# Patient Record
Sex: FEMALE | Race: WHITE | NOT HISPANIC OR LATINO | Employment: FULL TIME | URBAN - METROPOLITAN AREA
[De-identification: names, ages, dates, MRNs, and addresses within clinical notes are randomized per-mention and may not be internally consistent; named-entity substitution may affect disease eponyms.]

---

## 2018-10-02 ENCOUNTER — RECORDS - HEALTHEAST (OUTPATIENT)
Dept: LAB | Facility: CLINIC | Age: 53
End: 2018-10-02

## 2018-10-02 LAB
T3 SERPL-MCNC: 71 NG/DL (ref 45–175)
T4 FREE SERPL-MCNC: 1 NG/DL (ref 0.7–1.8)
THYROID PEROXIDASE ANTIBODIES - HISTORICAL: 161.5 IU/ML (ref 0–5.6)
TSH SERPL DL<=0.005 MIU/L-ACNC: 2.46 UIU/ML (ref 0.3–5)

## 2018-10-05 LAB — TSI ACT/NOR SER: 100 %

## 2019-12-03 ENCOUNTER — RECORDS - HEALTHEAST (OUTPATIENT)
Dept: LAB | Facility: CLINIC | Age: 54
End: 2019-12-03

## 2019-12-05 LAB — BACTERIA SPEC CULT: NORMAL

## 2020-11-24 ENCOUNTER — RECORDS - HEALTHEAST (OUTPATIENT)
Dept: LAB | Facility: CLINIC | Age: 55
End: 2020-11-24

## 2020-11-24 LAB
CHOLEST SERPL-MCNC: 181 MG/DL
FASTING STATUS PATIENT QL REPORTED: YES
FASTING STATUS PATIENT QL REPORTED: YES
HDLC SERPL-MCNC: 64 MG/DL
HOMOCYSTEINE PLASMA - HISTORICAL: 9 UMOL/L (ref 0–13)
LDLC SERPL CALC-MCNC: 106 MG/DL
T4 FREE SERPL-MCNC: 1 NG/DL (ref 0.7–1.8)
TRIGL SERPL-MCNC: 55 MG/DL
TSH SERPL DL<=0.005 MIU/L-ACNC: 2.51 UIU/ML (ref 0.3–5)
VIT B12 SERPL-MCNC: >2000 PG/ML (ref 213–816)

## 2020-11-26 LAB — METHYLMALONATE SERPL-SCNC: <0.1 UMOL/L (ref 0–0.4)

## 2021-04-08 ENCOUNTER — AMBULATORY - HEALTHEAST (OUTPATIENT)
Dept: NURSING | Facility: CLINIC | Age: 56
End: 2021-04-08

## 2021-04-29 ENCOUNTER — AMBULATORY - HEALTHEAST (OUTPATIENT)
Dept: NURSING | Facility: CLINIC | Age: 56
End: 2021-04-29

## 2021-05-28 ENCOUNTER — RECORDS - HEALTHEAST (OUTPATIENT)
Dept: LAB | Facility: CLINIC | Age: 56
End: 2021-05-28

## 2021-05-28 LAB
ALBUMIN SERPL-MCNC: 4.3 G/DL (ref 3.5–5)
ALP SERPL-CCNC: 102 U/L (ref 45–120)
ALT SERPL W P-5'-P-CCNC: <9 U/L (ref 0–45)
ANION GAP SERPL CALCULATED.3IONS-SCNC: 12 MMOL/L (ref 5–18)
AST SERPL W P-5'-P-CCNC: 16 U/L (ref 0–40)
BILIRUB SERPL-MCNC: 0.7 MG/DL (ref 0–1)
BUN SERPL-MCNC: 11 MG/DL (ref 8–22)
CALCIUM SERPL-MCNC: 9.4 MG/DL (ref 8.5–10.5)
CHLORIDE BLD-SCNC: 104 MMOL/L (ref 98–107)
CO2 SERPL-SCNC: 25 MMOL/L (ref 22–31)
CREAT SERPL-MCNC: 0.84 MG/DL (ref 0.6–1.1)
GFR SERPL CREATININE-BSD FRML MDRD: >60 ML/MIN/1.73M2
GLUCOSE BLD-MCNC: 85 MG/DL (ref 70–125)
POTASSIUM BLD-SCNC: 3.9 MMOL/L (ref 3.5–5)
PROT SERPL-MCNC: 6.9 G/DL (ref 6–8)
SODIUM SERPL-SCNC: 141 MMOL/L (ref 136–145)
TSH SERPL DL<=0.005 MIU/L-ACNC: 2.01 UIU/ML (ref 0.3–5)

## 2021-06-01 ENCOUNTER — RECORDS - HEALTHEAST (OUTPATIENT)
Dept: LAB | Facility: CLINIC | Age: 56
End: 2021-06-01

## 2021-06-01 ENCOUNTER — RECORDS - HEALTHEAST (OUTPATIENT)
Dept: ADMINISTRATIVE | Facility: CLINIC | Age: 56
End: 2021-06-01

## 2021-06-01 LAB
C DIFF TOX B STL QL: NEGATIVE
RIBOTYPE 027/NAP1/BI: NORMAL
WBC STL QL MICRO: NORMAL

## 2021-06-02 ENCOUNTER — RECORDS - HEALTHEAST (OUTPATIENT)
Dept: ADMINISTRATIVE | Facility: CLINIC | Age: 56
End: 2021-06-02

## 2021-06-02 LAB
FAT QUALITATIVE STOOL NEUTRAL - HISTORICAL: NORMAL GLOBULES/HPF
FAT QUALITATIVE STOOL TOTAL - HISTORICAL: NORMAL GLOBULES/HPF
O+P STL MICRO: NORMAL

## 2021-06-15 ENCOUNTER — RECORDS - HEALTHEAST (OUTPATIENT)
Dept: LAB | Facility: CLINIC | Age: 56
End: 2021-06-15

## 2021-06-16 ENCOUNTER — RECORDS - HEALTHEAST (OUTPATIENT)
Dept: LAB | Facility: CLINIC | Age: 56
End: 2021-06-16

## 2021-06-16 LAB
ANION GAP SERPL CALCULATED.3IONS-SCNC: 7 MMOL/L (ref 5–18)
BASOPHILS # BLD AUTO: 0 THOU/UL (ref 0–0.2)
BASOPHILS NFR BLD AUTO: 1 % (ref 0–2)
BUN SERPL-MCNC: 15 MG/DL (ref 8–22)
C REACTIVE PROTEIN LHE: 0.6 MG/DL (ref 0–0.8)
CALCIUM SERPL-MCNC: 9.5 MG/DL (ref 8.5–10.5)
CHLORIDE BLD-SCNC: 107 MMOL/L (ref 98–107)
CO2 SERPL-SCNC: 27 MMOL/L (ref 22–31)
CREAT SERPL-MCNC: 0.82 MG/DL (ref 0.6–1.1)
EOSINOPHIL # BLD AUTO: 0.3 THOU/UL (ref 0–0.4)
EOSINOPHIL NFR BLD AUTO: 6 % (ref 0–6)
ERYTHROCYTE [DISTWIDTH] IN BLOOD BY AUTOMATED COUNT: 12.4 % (ref 11–14.5)
ERYTHROCYTE [SEDIMENTATION RATE] IN BLOOD BY WESTERGREN METHOD: 20 MM/HR (ref 0–20)
GFR SERPL CREATININE-BSD FRML MDRD: >60 ML/MIN/1.73M2
GLUCOSE BLD-MCNC: 93 MG/DL (ref 70–125)
HBA1C MFR BLD: 5.2 %
HBV SURFACE AG SERPL QL IA: NEGATIVE
HCT VFR BLD AUTO: 34.6 % (ref 35–47)
HCV AB SERPL QL IA: NEGATIVE
HGB BLD-MCNC: 11.1 G/DL (ref 12–16)
IMM GRANULOCYTES # BLD: 0 THOU/UL
IMM GRANULOCYTES NFR BLD: 0 %
LYMPHOCYTES # BLD AUTO: 1.3 THOU/UL (ref 0.8–4.4)
LYMPHOCYTES NFR BLD AUTO: 34 % (ref 20–40)
MCH RBC QN AUTO: 29.9 PG (ref 27–34)
MCHC RBC AUTO-ENTMCNC: 32.1 G/DL (ref 32–36)
MCV RBC AUTO: 93 FL (ref 80–100)
MONOCYTES # BLD AUTO: 0.3 THOU/UL (ref 0–0.9)
MONOCYTES NFR BLD AUTO: 8 % (ref 2–10)
NEUTROPHILS # BLD AUTO: 2 THOU/UL (ref 2–7.7)
NEUTROPHILS NFR BLD AUTO: 50 % (ref 50–70)
PLATELET # BLD AUTO: 211 THOU/UL (ref 140–440)
PMV BLD AUTO: 9.7 FL (ref 8.5–12.5)
POTASSIUM BLD-SCNC: 3.9 MMOL/L (ref 3.5–5)
RBC # BLD AUTO: 3.71 MILL/UL (ref 3.8–5.4)
SODIUM SERPL-SCNC: 141 MMOL/L (ref 136–145)
T4 FREE SERPL-MCNC: 0.9 NG/DL (ref 0.7–1.8)
TSH SERPL DL<=0.005 MIU/L-ACNC: 3.35 UIU/ML (ref 0.3–5)
VIT B12 SERPL-MCNC: 571 PG/ML (ref 213–816)
WBC: 3.9 THOU/UL (ref 4–11)

## 2021-06-17 LAB
FASTING STATUS PATIENT QL REPORTED: YES
HOMOCYSTEINE PLASMA - HISTORICAL: 4 UMOL/L (ref 0–13)

## 2021-06-18 LAB — ANA SER QL: 1 U

## 2021-06-19 LAB — PYRIDOXAL PHOS SERPL-SCNC: 51.4 NMOL/L (ref 20–125)

## 2021-06-20 LAB — METHYLMALONATE SERPL-SCNC: 0.13 UMOL/L (ref 0–0.4)

## 2021-06-30 ENCOUNTER — RECORDS - HEALTHEAST (OUTPATIENT)
Dept: LAB | Facility: CLINIC | Age: 56
End: 2021-06-30

## 2021-06-30 LAB
FERRITIN SERPL-MCNC: 62 NG/ML (ref 10–130)
FOLATE SERPL-MCNC: 17.4 NG/ML
IRON SATN MFR SERPL: 37 % (ref 20–50)
IRON SERPL-MCNC: 110 UG/DL (ref 42–175)
TIBC SERPL-MCNC: 301 UG/DL (ref 313–563)
TRANSFERRIN SERPL-MCNC: 241 MG/DL (ref 212–360)

## 2021-07-01 LAB — 25(OH)D3 SERPL-MCNC: 42.4 NG/ML (ref 30–80)

## 2021-07-04 LAB — VIT B1 PYROPHOSHATE BLD-SCNC: 111 NMOL/L (ref 70–180)

## 2021-07-21 ENCOUNTER — RECORDS - HEALTHEAST (OUTPATIENT)
Dept: ADMINISTRATIVE | Facility: CLINIC | Age: 56
End: 2021-07-21

## 2021-08-21 ENCOUNTER — HEALTH MAINTENANCE LETTER (OUTPATIENT)
Age: 56
End: 2021-08-21

## 2021-10-13 DIAGNOSIS — Z11.59 ENCOUNTER FOR SCREENING FOR OTHER VIRAL DISEASES: ICD-10-CM

## 2021-10-14 RX ORDER — BUDESONIDE 3 MG/1
3 CAPSULE, COATED PELLETS ORAL EVERY MORNING
COMMUNITY
End: 2024-06-10

## 2021-10-14 RX ORDER — SUMATRIPTAN 50 MG/1
50 TABLET, FILM COATED ORAL
COMMUNITY
End: 2024-06-10

## 2021-10-14 RX ORDER — CETIRIZINE HYDROCHLORIDE 10 MG/1
10 TABLET ORAL EVERY EVENING
COMMUNITY

## 2021-10-15 ENCOUNTER — LAB REQUISITION (OUTPATIENT)
Dept: LAB | Facility: CLINIC | Age: 56
End: 2021-10-15
Payer: COMMERCIAL

## 2021-10-15 DIAGNOSIS — Z01.818 ENCOUNTER FOR OTHER PREPROCEDURAL EXAMINATION: ICD-10-CM

## 2021-10-15 PROCEDURE — U0003 INFECTIOUS AGENT DETECTION BY NUCLEIC ACID (DNA OR RNA); SEVERE ACUTE RESPIRATORY SYNDROME CORONAVIRUS 2 (SARS-COV-2) (CORONAVIRUS DISEASE [COVID-19]), AMPLIFIED PROBE TECHNIQUE, MAKING USE OF HIGH THROUGHPUT TECHNOLOGIES AS DESCRIBED BY CMS-2020-01-R: HCPCS | Mod: ORL | Performed by: PHYSICIAN ASSISTANT

## 2021-10-16 ENCOUNTER — HEALTH MAINTENANCE LETTER (OUTPATIENT)
Age: 56
End: 2021-10-16

## 2021-10-16 LAB — SARS-COV-2 RNA RESP QL NAA+PROBE: NEGATIVE

## 2021-10-18 ENCOUNTER — HOSPITAL ENCOUNTER (OUTPATIENT)
Facility: CLINIC | Age: 56
Discharge: HOME | End: 2021-10-18
Attending: SPECIALIST | Admitting: SPECIALIST
Payer: COMMERCIAL

## 2021-10-18 ENCOUNTER — ANESTHESIA EVENT (OUTPATIENT)
Dept: SURGERY | Facility: CLINIC | Age: 56
End: 2021-10-18
Payer: COMMERCIAL

## 2021-10-18 ENCOUNTER — ANESTHESIA (OUTPATIENT)
Dept: SURGERY | Facility: CLINIC | Age: 56
End: 2021-10-18
Payer: COMMERCIAL

## 2021-10-18 VITALS
HEART RATE: 88 BPM | RESPIRATION RATE: 32 BRPM | DIASTOLIC BLOOD PRESSURE: 67 MMHG | WEIGHT: 143.6 LBS | TEMPERATURE: 97 F | BODY MASS INDEX: 27.11 KG/M2 | OXYGEN SATURATION: 98 % | HEIGHT: 61 IN | SYSTOLIC BLOOD PRESSURE: 147 MMHG

## 2021-10-18 DIAGNOSIS — K81.9 CHOLECYSTITIS: Primary | ICD-10-CM

## 2021-10-18 LAB — HGB BLD-MCNC: 9.5 G/DL (ref 11.7–15.7)

## 2021-10-18 PROCEDURE — 710N000012 HC RECOVERY PHASE 2, PER MINUTE: Performed by: SPECIALIST

## 2021-10-18 PROCEDURE — 272N000001 HC OR GENERAL SUPPLY STERILE: Performed by: SPECIALIST

## 2021-10-18 PROCEDURE — 250N000013 HC RX MED GY IP 250 OP 250 PS 637: Performed by: ANESTHESIOLOGY

## 2021-10-18 PROCEDURE — 250N000011 HC RX IP 250 OP 636: Performed by: NURSE ANESTHETIST, CERTIFIED REGISTERED

## 2021-10-18 PROCEDURE — 370N000017 HC ANESTHESIA TECHNICAL FEE, PER MIN: Performed by: SPECIALIST

## 2021-10-18 PROCEDURE — 250N000011 HC RX IP 250 OP 636: Performed by: SPECIALIST

## 2021-10-18 PROCEDURE — 85018 HEMOGLOBIN: CPT | Performed by: SPECIALIST

## 2021-10-18 PROCEDURE — 710N000010 HC RECOVERY PHASE 1, LEVEL 2, PER MIN: Performed by: SPECIALIST

## 2021-10-18 PROCEDURE — 250N000009 HC RX 250: Performed by: NURSE ANESTHETIST, CERTIFIED REGISTERED

## 2021-10-18 PROCEDURE — 360N000076 HC SURGERY LEVEL 3, PER MIN: Performed by: SPECIALIST

## 2021-10-18 PROCEDURE — 258N000003 HC RX IP 258 OP 636: Performed by: ANESTHESIOLOGY

## 2021-10-18 PROCEDURE — 999N000141 HC STATISTIC PRE-PROCEDURE NURSING ASSESSMENT: Performed by: SPECIALIST

## 2021-10-18 PROCEDURE — 36415 COLL VENOUS BLD VENIPUNCTURE: CPT | Performed by: SPECIALIST

## 2021-10-18 PROCEDURE — 250N000025 HC SEVOFLURANE, PER MIN: Performed by: SPECIALIST

## 2021-10-18 PROCEDURE — 88304 TISSUE EXAM BY PATHOLOGIST: CPT | Mod: TC | Performed by: SPECIALIST

## 2021-10-18 PROCEDURE — 250N000011 HC RX IP 250 OP 636: Performed by: ANESTHESIOLOGY

## 2021-10-18 RX ORDER — ONDANSETRON 2 MG/ML
INJECTION INTRAMUSCULAR; INTRAVENOUS PRN
Status: DISCONTINUED | OUTPATIENT
Start: 2021-10-18 | End: 2021-10-18

## 2021-10-18 RX ORDER — PROPOFOL 10 MG/ML
INJECTION, EMULSION INTRAVENOUS PRN
Status: DISCONTINUED | OUTPATIENT
Start: 2021-10-18 | End: 2021-10-18

## 2021-10-18 RX ORDER — FENTANYL CITRATE 50 UG/ML
INJECTION, SOLUTION INTRAMUSCULAR; INTRAVENOUS PRN
Status: DISCONTINUED | OUTPATIENT
Start: 2021-10-18 | End: 2021-10-18

## 2021-10-18 RX ORDER — BUPIVACAINE HYDROCHLORIDE 2.5 MG/ML
INJECTION, SOLUTION INFILTRATION; PERINEURAL PRN
Status: DISCONTINUED | OUTPATIENT
Start: 2021-10-18 | End: 2021-10-18 | Stop reason: HOSPADM

## 2021-10-18 RX ORDER — LIDOCAINE 40 MG/G
CREAM TOPICAL
Status: DISCONTINUED | OUTPATIENT
Start: 2021-10-18 | End: 2021-10-18 | Stop reason: HOSPADM

## 2021-10-18 RX ORDER — OXYCODONE AND ACETAMINOPHEN 5; 325 MG/1; MG/1
1 TABLET ORAL ONCE
Status: COMPLETED | OUTPATIENT
Start: 2021-10-18 | End: 2021-10-18

## 2021-10-18 RX ORDER — OXYCODONE AND ACETAMINOPHEN 5; 325 MG/1; MG/1
1 TABLET ORAL EVERY 6 HOURS PRN
Qty: 12 TABLET | Refills: 0 | Status: SHIPPED | OUTPATIENT
Start: 2021-10-18 | End: 2021-10-21

## 2021-10-18 RX ORDER — CEFAZOLIN SODIUM 2 G/100ML
2 INJECTION, SOLUTION INTRAVENOUS
Status: COMPLETED | OUTPATIENT
Start: 2021-10-18 | End: 2021-10-18

## 2021-10-18 RX ORDER — SODIUM CHLORIDE, SODIUM LACTATE, POTASSIUM CHLORIDE, CALCIUM CHLORIDE 600; 310; 30; 20 MG/100ML; MG/100ML; MG/100ML; MG/100ML
INJECTION, SOLUTION INTRAVENOUS CONTINUOUS
Status: DISCONTINUED | OUTPATIENT
Start: 2021-10-18 | End: 2021-10-18 | Stop reason: HOSPADM

## 2021-10-18 RX ORDER — ALBUTEROL SULFATE 90 UG/1
2 AEROSOL, METERED RESPIRATORY (INHALATION) 4 TIMES DAILY PRN
COMMUNITY

## 2021-10-18 RX ORDER — ONDANSETRON 2 MG/ML
4 INJECTION INTRAMUSCULAR; INTRAVENOUS EVERY 30 MIN PRN
Status: DISCONTINUED | OUTPATIENT
Start: 2021-10-18 | End: 2021-10-18 | Stop reason: HOSPADM

## 2021-10-18 RX ORDER — ONDANSETRON 4 MG/1
4 TABLET, ORALLY DISINTEGRATING ORAL EVERY 30 MIN PRN
Status: DISCONTINUED | OUTPATIENT
Start: 2021-10-18 | End: 2021-10-18 | Stop reason: HOSPADM

## 2021-10-18 RX ORDER — OXYCODONE HYDROCHLORIDE 5 MG/1
5 TABLET ORAL EVERY 4 HOURS PRN
Status: DISCONTINUED | OUTPATIENT
Start: 2021-10-18 | End: 2021-10-18 | Stop reason: HOSPADM

## 2021-10-18 RX ORDER — HYDROMORPHONE HCL IN WATER/PF 6 MG/30 ML
0.2 PATIENT CONTROLLED ANALGESIA SYRINGE INTRAVENOUS EVERY 5 MIN PRN
Status: DISCONTINUED | OUTPATIENT
Start: 2021-10-18 | End: 2021-10-18 | Stop reason: HOSPADM

## 2021-10-18 RX ORDER — FLUTICASONE PROPIONATE 50 MCG
2 SPRAY, SUSPENSION (ML) NASAL DAILY PRN
COMMUNITY

## 2021-10-18 RX ORDER — DEXAMETHASONE SODIUM PHOSPHATE 10 MG/ML
INJECTION, SOLUTION INTRAMUSCULAR; INTRAVENOUS PRN
Status: DISCONTINUED | OUTPATIENT
Start: 2021-10-18 | End: 2021-10-18

## 2021-10-18 RX ORDER — HALOPERIDOL 5 MG/ML
1 INJECTION INTRAMUSCULAR
Status: DISCONTINUED | OUTPATIENT
Start: 2021-10-18 | End: 2021-10-18 | Stop reason: HOSPADM

## 2021-10-18 RX ORDER — FENTANYL CITRATE 50 UG/ML
25 INJECTION, SOLUTION INTRAMUSCULAR; INTRAVENOUS EVERY 5 MIN PRN
Status: DISCONTINUED | OUTPATIENT
Start: 2021-10-18 | End: 2021-10-18 | Stop reason: HOSPADM

## 2021-10-18 RX ORDER — ONDANSETRON 2 MG/ML
4 INJECTION INTRAMUSCULAR; INTRAVENOUS ONCE
Status: COMPLETED | OUTPATIENT
Start: 2021-10-18 | End: 2021-10-18

## 2021-10-18 RX ORDER — LIDOCAINE HYDROCHLORIDE 10 MG/ML
INJECTION, SOLUTION INFILTRATION; PERINEURAL PRN
Status: DISCONTINUED | OUTPATIENT
Start: 2021-10-18 | End: 2021-10-18

## 2021-10-18 RX ORDER — MAGNESIUM SULFATE 4 G/50ML
4 INJECTION INTRAVENOUS ONCE
Status: COMPLETED | OUTPATIENT
Start: 2021-10-18 | End: 2021-10-18

## 2021-10-18 RX ORDER — HYDROMORPHONE HCL IN WATER/PF 6 MG/30 ML
0.2 PATIENT CONTROLLED ANALGESIA SYRINGE INTRAVENOUS ONCE
Status: COMPLETED | OUTPATIENT
Start: 2021-10-18 | End: 2021-10-18

## 2021-10-18 RX ORDER — CEFAZOLIN SODIUM 2 G/100ML
2 INJECTION, SOLUTION INTRAVENOUS SEE ADMIN INSTRUCTIONS
Status: DISCONTINUED | OUTPATIENT
Start: 2021-10-18 | End: 2021-10-18 | Stop reason: HOSPADM

## 2021-10-18 RX ADMIN — FENTANYL CITRATE 25 MCG: 50 INJECTION, SOLUTION INTRAMUSCULAR; INTRAVENOUS at 15:05

## 2021-10-18 RX ADMIN — ONDANSETRON 4 MG: 2 INJECTION INTRAMUSCULAR; INTRAVENOUS at 14:16

## 2021-10-18 RX ADMIN — HYDROMORPHONE HYDROCHLORIDE 0.2 MG: 0.2 INJECTION, SOLUTION INTRAMUSCULAR; INTRAVENOUS; SUBCUTANEOUS at 15:44

## 2021-10-18 RX ADMIN — SODIUM CHLORIDE, POTASSIUM CHLORIDE, SODIUM LACTATE AND CALCIUM CHLORIDE: 600; 310; 30; 20 INJECTION, SOLUTION INTRAVENOUS at 12:29

## 2021-10-18 RX ADMIN — HYDROMORPHONE HYDROCHLORIDE 0.2 MG: 0.2 INJECTION, SOLUTION INTRAMUSCULAR; INTRAVENOUS; SUBCUTANEOUS at 15:21

## 2021-10-18 RX ADMIN — OXYCODONE HYDROCHLORIDE 5 MG: 5 TABLET ORAL at 16:47

## 2021-10-18 RX ADMIN — HYDROMORPHONE HYDROCHLORIDE 0.2 MG: 0.2 INJECTION, SOLUTION INTRAMUSCULAR; INTRAVENOUS; SUBCUTANEOUS at 16:02

## 2021-10-18 RX ADMIN — ONDANSETRON 4 MG: 2 INJECTION INTRAMUSCULAR; INTRAVENOUS at 18:51

## 2021-10-18 RX ADMIN — MAGNESIUM SULFATE HEPTAHYDRATE 4 G: 80 INJECTION, SOLUTION INTRAVENOUS at 12:33

## 2021-10-18 RX ADMIN — FENTANYL CITRATE 100 MCG: 50 INJECTION, SOLUTION INTRAMUSCULAR; INTRAVENOUS at 13:23

## 2021-10-18 RX ADMIN — SUGAMMADEX 125 MG: 100 INJECTION, SOLUTION INTRAVENOUS at 14:32

## 2021-10-18 RX ADMIN — FENTANYL CITRATE 25 MCG: 50 INJECTION, SOLUTION INTRAMUSCULAR; INTRAVENOUS at 15:29

## 2021-10-18 RX ADMIN — HYDROMORPHONE HYDROCHLORIDE 0.5 MG: 1 INJECTION, SOLUTION INTRAMUSCULAR; INTRAVENOUS; SUBCUTANEOUS at 14:30

## 2021-10-18 RX ADMIN — FENTANYL CITRATE 25 MCG: 50 INJECTION, SOLUTION INTRAMUSCULAR; INTRAVENOUS at 14:55

## 2021-10-18 RX ADMIN — FENTANYL CITRATE 100 MCG: 50 INJECTION, SOLUTION INTRAMUSCULAR; INTRAVENOUS at 14:41

## 2021-10-18 RX ADMIN — HYDROMORPHONE HYDROCHLORIDE 0.2 MG: 0.2 INJECTION, SOLUTION INTRAMUSCULAR; INTRAVENOUS; SUBCUTANEOUS at 15:27

## 2021-10-18 RX ADMIN — ONDANSETRON 4 MG: 4 TABLET, ORALLY DISINTEGRATING ORAL at 16:18

## 2021-10-18 RX ADMIN — OXYCODONE HYDROCHLORIDE AND ACETAMINOPHEN 1 TABLET: 5; 325 TABLET ORAL at 18:52

## 2021-10-18 RX ADMIN — SODIUM CHLORIDE, POTASSIUM CHLORIDE, SODIUM LACTATE AND CALCIUM CHLORIDE: 600; 310; 30; 20 INJECTION, SOLUTION INTRAVENOUS at 13:50

## 2021-10-18 RX ADMIN — HYDROMORPHONE HYDROCHLORIDE 0.2 MG: 0.2 INJECTION, SOLUTION INTRAMUSCULAR; INTRAVENOUS; SUBCUTANEOUS at 18:51

## 2021-10-18 RX ADMIN — DEXAMETHASONE SODIUM PHOSPHATE 10 MG: 10 INJECTION, SOLUTION INTRAMUSCULAR; INTRAVENOUS at 13:32

## 2021-10-18 RX ADMIN — FENTANYL CITRATE 25 MCG: 50 INJECTION, SOLUTION INTRAMUSCULAR; INTRAVENOUS at 15:00

## 2021-10-18 RX ADMIN — FENTANYL CITRATE 25 MCG: 50 INJECTION, SOLUTION INTRAMUSCULAR; INTRAVENOUS at 15:10

## 2021-10-18 RX ADMIN — HYDROMORPHONE HYDROCHLORIDE 0.2 MG: 0.2 INJECTION, SOLUTION INTRAMUSCULAR; INTRAVENOUS; SUBCUTANEOUS at 15:14

## 2021-10-18 RX ADMIN — FENTANYL CITRATE 25 MCG: 50 INJECTION, SOLUTION INTRAMUSCULAR; INTRAVENOUS at 15:16

## 2021-10-18 RX ADMIN — MIDAZOLAM 2 MG: 1 INJECTION INTRAMUSCULAR; INTRAVENOUS at 13:17

## 2021-10-18 RX ADMIN — CEFAZOLIN SODIUM 2 G: 2 INJECTION, SOLUTION INTRAVENOUS at 13:17

## 2021-10-18 RX ADMIN — FENTANYL CITRATE 25 MCG: 50 INJECTION, SOLUTION INTRAMUSCULAR; INTRAVENOUS at 15:23

## 2021-10-18 RX ADMIN — LIDOCAINE HYDROCHLORIDE 50 MG: 10 INJECTION, SOLUTION INFILTRATION; PERINEURAL at 13:23

## 2021-10-18 RX ADMIN — ROCURONIUM BROMIDE 40 MG: 10 INJECTION, SOLUTION INTRAVENOUS at 13:23

## 2021-10-18 RX ADMIN — PROPOFOL 150 MG: 10 INJECTION, EMULSION INTRAVENOUS at 13:23

## 2021-10-18 RX ADMIN — HYDROMORPHONE HYDROCHLORIDE 0.5 MG: 1 INJECTION, SOLUTION INTRAMUSCULAR; INTRAVENOUS; SUBCUTANEOUS at 13:46

## 2021-10-18 RX ADMIN — FENTANYL CITRATE 25 MCG: 50 INJECTION, SOLUTION INTRAMUSCULAR; INTRAVENOUS at 15:34

## 2021-10-18 ASSESSMENT — ENCOUNTER SYMPTOMS: SEIZURES: 0

## 2021-10-18 ASSESSMENT — MIFFLIN-ST. JEOR: SCORE: 1180.81

## 2021-10-18 NOTE — PROGRESS NOTES
Pharmacy Note - Admission Medication History    Pertinent Provider Information: none     ______________________________________________________________________    Prior To Admission (PTA) med list completed and updated in EMR.       PTA Med List   Medication Sig Last Dose     albuterol (PROAIR HFA/PROVENTIL HFA/VENTOLIN HFA) 108 (90 Base) MCG/ACT inhaler Inhale 2 puffs into the lungs 4 times daily as needed  Past Month at Unknown time     amitriptyline (ELAVIL) 25 MG tablet Take 25 mg by mouth At Bedtime 10/17/2021 at Unknown time     budesonide (ENTOCORT EC) 3 MG EC capsule Take 6 mg by mouth every morning 10/18/2021 at Unknown time     cetirizine (ZYRTEC) 10 MG tablet Take 10 mg by mouth daily 10/18/2021 at Unknown time     fluticasone (FLONASE) 50 MCG/ACT nasal spray Spray 2 sprays into both nostrils daily 10/18/2021 at albuter     melatonin (MELATONIN) 1 MG/ML LIQD liquid Take 2 mg by mouth nightly as needed for sleep Past Week at Unknown time     SUMAtriptan (IMITREX) 50 MG tablet Take 50 mg by mouth at onset of headache for migraine Past Week at Unknown time       Information source(s): Patient  Method of interview communication: in-person    Summary of Changes to PTA Med List  New: flonase, albuterol      Patient was asked about OTC/herbal products specifically.  PTA med list reflects this.    In the past week, patient estimated taking medication this percent of the time:  greater than 90%.    Allergies were reviewed, assessed, and updated with the patient.      Patient does not anticipate needing any multi-use medications during admission.    The information provided in this note is only as accurate as the sources available at the time of the update(s).    Thank you for the opportunity to participate in the care of this patient.    Darwin Guajardo RPH  10/18/2021 12:11 PM

## 2021-10-18 NOTE — ANESTHESIA POSTPROCEDURE EVALUATION
Patient: Vira Fallon    Procedure: Procedure(s):  LAPAROSCOPIC CHOLECYSTECTOMY       Diagnosis:Calculus of gallbladder with other cholecystitis without obstruction [K80.18]  Diagnosis Additional Information: No value filed.    Anesthesia Type:  General    Note:  Disposition: Outpatient   Postop Pain Control: Uneventful            Sign Out: Well controlled pain   PONV: No   Neuro/Psych: Uneventful            Sign Out: Acceptable/Baseline neuro status   Airway/Respiratory: Uneventful            Sign Out: Acceptable/Baseline resp. status   CV/Hemodynamics: Uneventful            Sign Out: Acceptable CV status   Other NRE: NONE   DID A NON-ROUTINE EVENT OCCUR? No           Last vitals:  Vitals Value Taken Time   /65 10/18/21 1500   Temp 35.9  C (96.7  F) 10/18/21 1446   Pulse 86 10/18/21 1509   Resp 26 10/18/21 1509   SpO2 100 % 10/18/21 1509   Vitals shown include unvalidated device data.    Electronically Signed By: Juan Paula MD  October 18, 2021  3:09 PM

## 2021-10-18 NOTE — ANESTHESIA PROCEDURE NOTES
Airway       Patient location during procedure: OR       Procedure Start/Stop Times: 10/18/2021 1:27 PM  Staff -        CRNA: Nora Bartlett APRN CRNA       Performed By: CRNA  Consent for Airway        Urgency: elective  Indications and Patient Condition       Indications for airway management: adis-procedural         Mask difficulty assessment: 1 - vent by mask    Final Airway Details       Final airway type: endotracheal airway       Successful airway: ETT - single  Endotracheal Airway Details        ETT size (mm): 7.0       Cuffed: yes       Successful intubation technique: video laryngoscopy       VL Blade Size: MAC 3       Grade View of Cords: 1       Adjucts: stylet       Position: Right       Measured from: gums/teeth       Bite block used: Oral Airway    Post intubation assessment        Placement verified by: capnometry, equal breath sounds and chest rise        Number of attempts at approach: 1       Number of other approaches attempted: 0       Secured with: silk tape       Ease of procedure: easy       Dentition: Intact    Additional Comments       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

## 2021-10-18 NOTE — OP NOTE
[facility]  Operative Note    Pre-operative diagnosis: Calculus of gallbladder with other cholecystitis without obstruction [K80.18]   Post-operative diagnosis * No post-op diagnosis entered *   Procedure: Procedure(s):  LAPAROSCOPIC CHOLECYSTECTOMY , extensive lysis peritoneal adhesions, peritoneal lavage   Surgeon: Steve Ba MD, MD   Assistants(s): None   Anesthesia: General    Estimated blood loss:  100 mL    Total IV fluids: (See anesthesia record)   Blood transfusion: No transfusion was given during surgery   Total urine output: (See anesthesia record)   Drains:  10 round RULA drain   Specimens: ID Type Source Tests Collected by Time Destination   1 :  Tissue Gallbladder with Stone(s) SURGICAL PATHOLOGY EXAM Steve Ba MD 10/18/2021  2:01 PM        Implants: * No implants in log *    Findings:  Severe cholecystitis with pericholecystic abscess   Complications: None.   Condition: Stable       Description of procedure:  Patient was prepped draped in usual fashion after induction of a general anesthetic and infraumbilical incision was made a Veress needle inserted into the abdomen pneumoperitoneum established at 14 mmHg 5 the Veress needle was removed 5 mm trocar and camera were placed under direct vision to 11 mm trochars were placed in the upper abdomen the gallbladder was very distended with the omentum adherent to the anterior surface these adhesions were taken down bluntly there was significant amount of cicatricial changes.  There was a moderate sized abscess between the anterior gallbladder wall and of the omentum.  Once these adhesions were all taken down and the abscess was drained a small opening was made in the gallbladder and 2 large impacted stones were then removed from the peritoneal cavity in a specimen retrieval bag.  The neck of the gallbladder was dissected free exposing the cystic duct and artery critical angle view was obtained and then the duct and  artery were divided between endoclips there are all gallbladder was then removed from the hepatic fossa using cautery there is a moderate amount of scarring and inflammatory changes of the gallbladder was then removed intact in a specimen retrieval bag the abdomen and pelvis was then copiously lavaged with saline solution there was no evidence for any active bleeding or bile leakage.  A piece of Surgicel netting was then placed into the hepatic fossa similarly through the lateral port a 10 Khmer round Titus-Ruiz drain was then placed over the liver edge.  This was tacked the skin with interrupted silk suture all trochars removed under direct vision the CherelleJose was then used to close the subxiphoid fascia with 0 Vicryl the pneumoperitoneum was completely evacuated with suction all skin incisions were closed with 4-0 Vicryl suture.    Steve Ba MD, MD

## 2021-10-18 NOTE — ANESTHESIA CARE TRANSFER NOTE
Patient: Vira Fallon    Procedure: Procedure(s):  LAPAROSCOPIC CHOLECYSTECTOMY       Diagnosis: Calculus of gallbladder with other cholecystitis without obstruction [K80.18]  Diagnosis Additional Information: No value filed.    Anesthesia Type:   General     Note:    Oropharynx: oropharynx clear of all foreign objects  Level of Consciousness: awake  Oxygen Supplementation: face mask  Level of Supplemental Oxygen (L/min / FiO2): 8  Independent Airway: airway patency satisfactory and stable  Dentition: dentition unchanged  Vital Signs Stable: post-procedure vital signs reviewed and stable  Report to RN Given: handoff report given  Patient transferred to: PACU    Handoff Report: Identifed the Patient, Identified the Reponsible Provider, Reviewed the pertinent medical history, Discussed the surgical course, Reviewed Intra-OP anesthesia mangement and issues during anesthesia, Set expectations for post-procedure period and Allowed opportunity for questions and acknowledgement of understanding      Vitals:  Vitals Value Taken Time   /97 10/18/21 1447   Temp 35.9  C (96.7  F) 10/18/21 1446   Pulse 90 10/18/21 1447   Resp 10 10/18/21 1446   SpO2 99 % 10/18/21 1447   Vitals shown include unvalidated device data.    Electronically Signed By: RONNIE PYLE CRNA  October 18, 2021  2:47 PM

## 2021-10-18 NOTE — BRIEF OP NOTE
[facility]    Brief Operative Note    Pre-operative diagnosis: Calculus of gallbladder with other cholecystitis without obstruction [K80.18]  Post-operative diagnosis same  Procedure: Procedure(s):  LAPAROSCOPIC CHOLECYSTECTOMY, extensive lysis peritoneal adhesions.  Peritoneal lavage  Surgeon: Surgeon(s) and Role:     * Steve Ba MD - Primary  Anesthesia: General   Estimated blood loss: 100 mL drains 10 round RULA drain  Specimens:   ID Type Source Tests Collected by Time Destination   1 :  Tissue Gallbladder with Stone(s) SURGICAL PATHOLOGY EXAM Steve Ba MD 10/18/2021  2:01 PM      Findings:   Severe cholecystitis with pericholecystic abscess  Complications: None.  Implants: * No implants in log *

## 2021-10-18 NOTE — ANESTHESIA PREPROCEDURE EVALUATION
Anesthesia Pre-Procedure Evaluation    Patient: Vira Fallon   MRN: [id number] : 1965        Preoperative Diagnosis: Calculus of gallbladder with other cholecystitis without obstruction [K80.18]    Procedure : Procedure(s):  LAPAROSCOPIC CHOLECYSTECTOMY          Past Medical History:   Diagnosis Date     Celiac disease      Colitis      Uncomplicated asthma       Past Surgical History:   Procedure Laterality Date     COLONOSCOPY       ORTHOPEDIC SURGERY        No Known Allergies   Social History     Tobacco Use     Smoking status: Never Smoker     Smokeless tobacco: Never Used   Substance Use Topics     Alcohol use: Not Currently     Comment: 1 x per year      Wt Readings from Last 1 Encounters:   10/18/21 65.1 kg (143 lb 9.6 oz)        Anesthesia Evaluation            ROS/MED HX  ENT/Pulmonary:     (+) Intermittent, asthma Treatment: Inhaler prn,   (-) sleep apnea   Neurologic:    (-) no seizures and no CVA   Cardiovascular:    (-) BOB   METS/Exercise Tolerance: >4 METS    Hematologic:       Musculoskeletal:       GI/Hepatic:  - neg GI/hepatic ROS     Renal/Genitourinary:  - neg Renal ROS     Endo:  - neg endo ROS     Psychiatric/Substance Use:       Infectious Disease:       Malignancy:       Other:            Physical Exam    Airway        Mallampati: II   TM distance: > 3 FB      Respiratory Devices and Support         Dental  no notable dental history         Cardiovascular          Rhythm and rate: regular and normal     Pulmonary           breath sounds clear to auscultation           OUTSIDE LABS:  CBC:   Lab Results   Component Value Date    WBC 3.9 (L) 2021    HGB 11.1 (L) 2021    HCT 34.6 (L) 2021     2021     BMP:   Lab Results   Component Value Date     2021     2021    POTASSIUM 3.9 2021    POTASSIUM 3.9 2021    CHLORIDE 107 2021    CHLORIDE 104 2021    CO2 27 2021    CO2 25 2021    BUN 15 2021     BUN 11 05/28/2021    CR 0.82 06/16/2021    CR 0.84 05/28/2021    GLC 93 06/16/2021    GLC 85 05/28/2021     COAGS: No results found for: PTT, INR, FIBR  POC: No results found for: BGM, HCG, HCGS  HEPATIC:   Lab Results   Component Value Date    ALBUMIN 4.3 05/28/2021    PROTTOTAL 6.9 05/28/2021    ALT <9 05/28/2021    AST 16 05/28/2021    ALKPHOS 102 05/28/2021    BILITOTAL 0.7 05/28/2021     OTHER:   Lab Results   Component Value Date    A1C 5.2 06/16/2021    ROBERTA 9.5 06/16/2021    TSH 3.35 06/16/2021    T3 71 10/02/2018    CRP 0.6 06/16/2021    SED 20 06/16/2021       Anesthesia Plan    ASA Status:  2   NPO Status:  NPO Appropriate    Anesthesia Type: General.     - Airway: ETT              Consents    Anesthesia Plan(s) and associated risks, benefits, and realistic alternatives discussed. Questions answered and patient/representative(s) expressed understanding.     - Discussed with:  Patient      - Extended Intubation/Ventilatory Support Discussed: No.      - Patient is DNR/DNI Status: No    Use of blood products discussed: No .     Postoperative Care    Pain management: IV analgesics.   PONV prophylaxis: Ondansetron (or other 5HT-3), Dexamethasone or Solumedrol     Comments:                Juan Paula MD

## 2021-10-19 LAB
PATH REPORT.COMMENTS IMP SPEC: NORMAL
PATH REPORT.COMMENTS IMP SPEC: NORMAL
PATH REPORT.FINAL DX SPEC: NORMAL
PATH REPORT.GROSS SPEC: NORMAL
PATH REPORT.MICROSCOPIC SPEC OTHER STN: NORMAL
PATH REPORT.RELEVANT HX SPEC: NORMAL
PHOTO IMAGE: NORMAL

## 2021-10-19 PROCEDURE — 88304 TISSUE EXAM BY PATHOLOGIST: CPT | Mod: 26 | Performed by: PATHOLOGY

## 2021-10-19 NOTE — OR NURSING
Patient stated that she has had to change the dressing around her drain site every couple of hours due to the dressings being soaked with drainage. Patient stated that she will be calling Dr. Ba's office this am for scheduling purposes and this writer instructed her to discuss dressing drainage with his office.

## 2021-10-19 NOTE — PROGRESS NOTES
Contacted Dr Ba re: copious amounts of RULA drainage.  Pt has had ~500cc of output in addition to saturated dripping wet dressing changes x3.  Dr Ba states he expects large amounts of output due to liberal irrigation intraop.  Hgb checked 9.5.  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  Ok for discharge per surgery.

## 2021-10-19 NOTE — DISCHARGE INSTRUCTIONS
Discharge Instructions: After Your Surgery  For the first 24 hours after your surgery:    Don't drive or use heavy equipment.    Don't make important decisions or sign legal papers.    Don't drink alcohol.    Have someone stay with you, if needed. He or she can watch for problems and help keep you safe.      Coping with pain    Plan to take your Percocet routinely every 6 hours.  Percocet has 5mg Oxycodone and 325mg Tylenol in one tablet.  Tylenol most effectively manages pain symptoms when it is taken on a schedule without missing doses.  You can safely take a total of 4000mg Tylenol in 24 hours.  So, plan to take 1 Percocet with 1 regular Tylenol every 6 hours.  Tylenol works synergistically with narcotic pain medicines and they make each other work better.  Do not take more than 4000mg Tylenol in 24 hours.  Take your next Percocet and additional Tylenol at 1am.  Once you feel in control of you pain management, you can start to dial back the pain medicines, starting with the narcotic.  You can do this by going a longer amount of time between doses, or cutting the tablet in half.      Most pain relievers taken by mouth need at least 20 to 30 minutes to start to work.      Don't wait till your pain becomes severe before you take your medicine. Try to time your medicine so that you can take it before starting an activity. This might be before you get dressed, go for a walk, or sit down for dinner.      Constipation is a common side effect of pain medicines. Drinking lots of fluids and eating foods such as fruits and vegetables that are high in fiber can also help. Drinking alcohol and taking pain medicine can cause dizziness and slow your breathing. It can even be deadly and harm your liver. Don't drink alcohol while taking pain medicine.      Pain medicine can make you react more slowly to things. Don't drive or run machinery while taking pain medicine.      Managing nausea    Some people have an upset stomach  after surgery. This is often because of anesthesia, pain, or pain medicine, or the stress of surgery.  These tips may help:    Don't push yourself to eat. Your body will tell you when to eat and how much.    Start off with clear liquids and soup. They are easier to digest.    Next try semi-solid foods, such as mashed potatoes, applesauce, and gelatin, as you feel ready.    Slowly move to solid foods. Don t eat fatty, rich, or spicy foods at first.    Don't force yourself to have 3 large meals a day. Instead eat smaller amounts more often.    Take pain medicines with a small amount of solid food, such as crackers or toast, to prevent nausea.      If you have obstructive sleep apnea    You were given anesthesia medicine during surgery to keep you comfortable and free of pain. After surgery, you may have more apnea spells because of this medicine and other medicines you were given. The spells may last longer than usual.     Keep using the continuous positive airway pressure (CPAP) device when you sleep. Unless your healthcare provider tells you not to, use it when you sleep, day or night. CPAP is a common device used to treat obstructive sleep apnea.      When to call your healthcare provider    Call your healthcare provider if:    You still have intolerable pain an hour after taking medicine. The medicine may not be strong enough.    You feel too sleepy, dizzy, or groggy. The medicine may be too strong.    You have side effects such as nausea or vomiting, or skin changes such as rash, itching, or hives. Your healthcare provider may suggest other medicines to control side effects.    Rash, itching, or hives may mean you have an allergic reaction. Report this right away. If you have trouble breathing or facial swelling, call 911 right away.

## 2021-10-30 ENCOUNTER — HOSPITAL ENCOUNTER (INPATIENT)
Facility: HOSPITAL | Age: 56
LOS: 2 days | Discharge: HOME | DRG: 863 | End: 2021-11-01
Attending: EMERGENCY MEDICINE | Admitting: HOSPITALIST
Payer: COMMERCIAL

## 2021-10-30 ENCOUNTER — APPOINTMENT (OUTPATIENT)
Dept: CT IMAGING | Facility: CLINIC | Age: 56
DRG: 863 | End: 2021-10-30
Attending: EMERGENCY MEDICINE
Payer: COMMERCIAL

## 2021-10-30 ENCOUNTER — HOSPITAL ENCOUNTER (INPATIENT)
Facility: CLINIC | Age: 56
LOS: 1 days | Discharge: ANOTHER HOSPITAL | DRG: 863 | End: 2021-10-30
Attending: EMERGENCY MEDICINE | Admitting: EMERGENCY MEDICINE
Payer: COMMERCIAL

## 2021-10-30 ENCOUNTER — APPOINTMENT (OUTPATIENT)
Dept: ULTRASOUND IMAGING | Facility: CLINIC | Age: 56
DRG: 863 | End: 2021-10-30
Attending: EMERGENCY MEDICINE
Payer: COMMERCIAL

## 2021-10-30 VITALS
DIASTOLIC BLOOD PRESSURE: 60 MMHG | TEMPERATURE: 98 F | OXYGEN SATURATION: 98 % | BODY MASS INDEX: 27.88 KG/M2 | SYSTOLIC BLOOD PRESSURE: 128 MMHG | RESPIRATION RATE: 19 BRPM | HEIGHT: 60 IN | HEART RATE: 97 BPM | WEIGHT: 142 LBS

## 2021-10-30 DIAGNOSIS — K65.1 POSTOPERATIVE INTRA-ABDOMINAL ABSCESS (H): ICD-10-CM

## 2021-10-30 DIAGNOSIS — T81.43XA POSTOPERATIVE INTRA-ABDOMINAL ABSCESS (H): ICD-10-CM

## 2021-10-30 DIAGNOSIS — K65.1 PERITONEAL ABSCESS (H): Primary | ICD-10-CM

## 2021-10-30 DIAGNOSIS — K65.1 PERITONEAL ABSCESS (H): ICD-10-CM

## 2021-10-30 DIAGNOSIS — D64.9 ANEMIA, UNSPECIFIED TYPE: ICD-10-CM

## 2021-10-30 PROBLEM — K52.9 COLITIS: Status: ACTIVE | Noted: 2021-10-30

## 2021-10-30 LAB
ALBUMIN SERPL-MCNC: 2.7 G/DL (ref 3.5–5)
ALBUMIN UR-MCNC: NEGATIVE MG/DL
ALP SERPL-CCNC: 100 U/L (ref 45–120)
ALT SERPL W P-5'-P-CCNC: 10 U/L (ref 0–45)
ANION GAP SERPL CALCULATED.3IONS-SCNC: 12 MMOL/L (ref 5–18)
APPEARANCE UR: CLEAR
AST SERPL W P-5'-P-CCNC: 12 U/L (ref 0–40)
BASOPHILS # BLD AUTO: 0 10E3/UL (ref 0–0.2)
BASOPHILS NFR BLD AUTO: 0 %
BILIRUB SERPL-MCNC: 0.5 MG/DL (ref 0–1)
BILIRUB UR QL STRIP: NEGATIVE
BUN SERPL-MCNC: 9 MG/DL (ref 8–22)
CALCIUM SERPL-MCNC: 9.1 MG/DL (ref 8.5–10.5)
CHLORIDE BLD-SCNC: 102 MMOL/L (ref 98–107)
CO2 SERPL-SCNC: 24 MMOL/L (ref 22–31)
COLOR UR AUTO: COLORLESS
CREAT SERPL-MCNC: 0.76 MG/DL (ref 0.6–1.1)
EOSINOPHIL # BLD AUTO: 0.1 10E3/UL (ref 0–0.7)
EOSINOPHIL NFR BLD AUTO: 1 %
ERYTHROCYTE [DISTWIDTH] IN BLOOD BY AUTOMATED COUNT: 13.2 % (ref 10–15)
GFR SERPL CREATININE-BSD FRML MDRD: 89 ML/MIN/1.73M2
GLUCOSE BLD-MCNC: 126 MG/DL (ref 70–125)
GLUCOSE UR STRIP-MCNC: NEGATIVE MG/DL
HCT VFR BLD AUTO: 26.3 % (ref 35–47)
HGB BLD-MCNC: 8.3 G/DL (ref 11.7–15.7)
HGB UR QL STRIP: NEGATIVE
IMM GRANULOCYTES # BLD: 0.1 10E3/UL
IMM GRANULOCYTES NFR BLD: 1 %
KETONES UR STRIP-MCNC: NEGATIVE MG/DL
LACTATE SERPL-SCNC: 1.5 MMOL/L (ref 0.7–2)
LEUKOCYTE ESTERASE UR QL STRIP: NEGATIVE
LIPASE SERPL-CCNC: 13 U/L (ref 0–52)
LYMPHOCYTES # BLD AUTO: 1.1 10E3/UL (ref 0.8–5.3)
LYMPHOCYTES NFR BLD AUTO: 11 %
MAGNESIUM SERPL-MCNC: 1.9 MG/DL (ref 1.8–2.6)
MCH RBC QN AUTO: 30.1 PG (ref 26.5–33)
MCHC RBC AUTO-ENTMCNC: 31.6 G/DL (ref 31.5–36.5)
MCV RBC AUTO: 95 FL (ref 78–100)
MONOCYTES # BLD AUTO: 0.7 10E3/UL (ref 0–1.3)
MONOCYTES NFR BLD AUTO: 7 %
NEUTROPHILS # BLD AUTO: 8.1 10E3/UL (ref 1.6–8.3)
NEUTROPHILS NFR BLD AUTO: 80 %
NITRATE UR QL: NEGATIVE
NRBC # BLD AUTO: 0 10E3/UL
NRBC BLD AUTO-RTO: 0 /100
PH UR STRIP: 6.5 [PH] (ref 5–7)
PLATELET # BLD AUTO: 507 10E3/UL (ref 150–450)
POTASSIUM BLD-SCNC: 3.4 MMOL/L (ref 3.5–5)
POTASSIUM BLD-SCNC: 4 MMOL/L (ref 3.5–5)
PROT SERPL-MCNC: 6.8 G/DL (ref 6–8)
RBC # BLD AUTO: 2.76 10E6/UL (ref 3.8–5.2)
RBC URINE: 0 /HPF
SARS-COV-2 RNA RESP QL NAA+PROBE: NEGATIVE
SODIUM SERPL-SCNC: 138 MMOL/L (ref 136–145)
SP GR UR STRIP: 1.03 (ref 1–1.03)
SQUAMOUS EPITHELIAL: 1 /HPF
UROBILINOGEN UR STRIP-MCNC: <2 MG/DL
WBC # BLD AUTO: 10 10E3/UL (ref 4–11)
WBC URINE: <1 /HPF

## 2021-10-30 PROCEDURE — 36415 COLL VENOUS BLD VENIPUNCTURE: CPT | Performed by: EMERGENCY MEDICINE

## 2021-10-30 PROCEDURE — 99222 1ST HOSP IP/OBS MODERATE 55: CPT | Performed by: HOSPITALIST

## 2021-10-30 PROCEDURE — 87186 SC STD MICRODIL/AGAR DIL: CPT | Performed by: RADIOLOGY

## 2021-10-30 PROCEDURE — 250N000011 HC RX IP 250 OP 636: Performed by: EMERGENCY MEDICINE

## 2021-10-30 PROCEDURE — 36415 COLL VENOUS BLD VENIPUNCTURE: CPT | Performed by: HOSPITALIST

## 2021-10-30 PROCEDURE — 96365 THER/PROPH/DIAG IV INF INIT: CPT | Mod: 59

## 2021-10-30 PROCEDURE — 80053 COMPREHEN METABOLIC PANEL: CPT | Performed by: EMERGENCY MEDICINE

## 2021-10-30 PROCEDURE — 76942 ECHO GUIDE FOR BIOPSY: CPT

## 2021-10-30 PROCEDURE — 87635 SARS-COV-2 COVID-19 AMP PRB: CPT | Performed by: EMERGENCY MEDICINE

## 2021-10-30 PROCEDURE — 84132 ASSAY OF SERUM POTASSIUM: CPT | Performed by: EMERGENCY MEDICINE

## 2021-10-30 PROCEDURE — 258N000003 HC RX IP 258 OP 636: Performed by: EMERGENCY MEDICINE

## 2021-10-30 PROCEDURE — 83735 ASSAY OF MAGNESIUM: CPT | Performed by: HOSPITALIST

## 2021-10-30 PROCEDURE — 250N000011 HC RX IP 250 OP 636: Performed by: HOSPITALIST

## 2021-10-30 PROCEDURE — 96375 TX/PRO/DX INJ NEW DRUG ADDON: CPT

## 2021-10-30 PROCEDURE — 250N000011 HC RX IP 250 OP 636: Performed by: RADIOLOGY

## 2021-10-30 PROCEDURE — 87205 SMEAR GRAM STAIN: CPT | Performed by: RADIOLOGY

## 2021-10-30 PROCEDURE — 83690 ASSAY OF LIPASE: CPT | Performed by: EMERGENCY MEDICINE

## 2021-10-30 PROCEDURE — 96361 HYDRATE IV INFUSION ADD-ON: CPT

## 2021-10-30 PROCEDURE — 85025 COMPLETE CBC W/AUTO DIFF WBC: CPT | Performed by: EMERGENCY MEDICINE

## 2021-10-30 PROCEDURE — 99285 EMERGENCY DEPT VISIT HI MDM: CPT | Mod: 25

## 2021-10-30 PROCEDURE — 83605 ASSAY OF LACTIC ACID: CPT | Performed by: EMERGENCY MEDICINE

## 2021-10-30 PROCEDURE — 0F9430Z DRAINAGE OF GALLBLADDER WITH DRAINAGE DEVICE, PERCUTANEOUS APPROACH: ICD-10-PCS | Performed by: RADIOLOGY

## 2021-10-30 PROCEDURE — C9803 HOPD COVID-19 SPEC COLLECT: HCPCS

## 2021-10-30 PROCEDURE — 74177 CT ABD & PELVIS W/CONTRAST: CPT

## 2021-10-30 PROCEDURE — 120N000001 HC R&B MED SURG/OB

## 2021-10-30 PROCEDURE — 250N000013 HC RX MED GY IP 250 OP 250 PS 637: Performed by: HOSPITALIST

## 2021-10-30 PROCEDURE — 81001 URINALYSIS AUTO W/SCOPE: CPT | Performed by: EMERGENCY MEDICINE

## 2021-10-30 PROCEDURE — 87075 CULTR BACTERIA EXCEPT BLOOD: CPT | Performed by: RADIOLOGY

## 2021-10-30 RX ORDER — LIDOCAINE 40 MG/G
CREAM TOPICAL
Status: DISCONTINUED | OUTPATIENT
Start: 2021-10-30 | End: 2021-11-01 | Stop reason: HOSPADM

## 2021-10-30 RX ORDER — ONDANSETRON 2 MG/ML
4 INJECTION INTRAMUSCULAR; INTRAVENOUS EVERY 6 HOURS PRN
Status: DISCONTINUED | OUTPATIENT
Start: 2021-10-30 | End: 2021-11-01 | Stop reason: HOSPADM

## 2021-10-30 RX ORDER — PIPERACILLIN SODIUM, TAZOBACTAM SODIUM 3; .375 G/15ML; G/15ML
3.38 INJECTION, POWDER, LYOPHILIZED, FOR SOLUTION INTRAVENOUS EVERY 8 HOURS
Status: DISCONTINUED | OUTPATIENT
Start: 2021-10-30 | End: 2021-11-01 | Stop reason: ALTCHOICE

## 2021-10-30 RX ORDER — MAGNESIUM HYDROXIDE/ALUMINUM HYDROXICE/SIMETHICONE 120; 1200; 1200 MG/30ML; MG/30ML; MG/30ML
30 SUSPENSION ORAL EVERY 4 HOURS PRN
Status: DISCONTINUED | OUTPATIENT
Start: 2021-10-30 | End: 2021-11-01 | Stop reason: HOSPADM

## 2021-10-30 RX ORDER — HYDROCODONE BITARTRATE AND ACETAMINOPHEN 5; 325 MG/1; MG/1
1 TABLET ORAL EVERY 6 HOURS PRN
Status: DISCONTINUED | OUTPATIENT
Start: 2021-10-30 | End: 2021-10-30

## 2021-10-30 RX ORDER — OXYCODONE HYDROCHLORIDE 5 MG/1
5 TABLET ORAL EVERY 4 HOURS PRN
Status: DISCONTINUED | OUTPATIENT
Start: 2021-10-30 | End: 2021-11-01 | Stop reason: HOSPADM

## 2021-10-30 RX ORDER — FLUTICASONE PROPIONATE 50 MCG
2 SPRAY, SUSPENSION (ML) NASAL DAILY PRN
Status: DISCONTINUED | OUTPATIENT
Start: 2021-10-30 | End: 2021-11-01 | Stop reason: HOSPADM

## 2021-10-30 RX ORDER — NALOXONE HYDROCHLORIDE 0.4 MG/ML
0.2 INJECTION, SOLUTION INTRAMUSCULAR; INTRAVENOUS; SUBCUTANEOUS
Status: DISCONTINUED | OUTPATIENT
Start: 2021-10-30 | End: 2021-11-01 | Stop reason: HOSPADM

## 2021-10-30 RX ORDER — NALOXONE HYDROCHLORIDE 0.4 MG/ML
0.4 INJECTION, SOLUTION INTRAMUSCULAR; INTRAVENOUS; SUBCUTANEOUS
Status: DISCONTINUED | OUTPATIENT
Start: 2021-10-30 | End: 2021-11-01 | Stop reason: HOSPADM

## 2021-10-30 RX ORDER — AMOXICILLIN 250 MG
2 CAPSULE ORAL 2 TIMES DAILY PRN
Status: DISCONTINUED | OUTPATIENT
Start: 2021-10-30 | End: 2021-11-01 | Stop reason: HOSPADM

## 2021-10-30 RX ORDER — ACETAMINOPHEN 500 MG
500-1000 TABLET ORAL EVERY 6 HOURS PRN
COMMUNITY

## 2021-10-30 RX ORDER — ONDANSETRON 8 MG/1
8 TABLET, ORALLY DISINTEGRATING ORAL EVERY 8 HOURS PRN
COMMUNITY
End: 2024-06-10

## 2021-10-30 RX ORDER — HYDROMORPHONE HCL IN WATER/PF 6 MG/30 ML
0.4 PATIENT CONTROLLED ANALGESIA SYRINGE INTRAVENOUS
Status: DISCONTINUED | OUTPATIENT
Start: 2021-10-30 | End: 2021-11-01 | Stop reason: HOSPADM

## 2021-10-30 RX ORDER — HYDROCODONE BITARTRATE AND ACETAMINOPHEN 5; 300 MG/1; MG/1
1 TABLET ORAL EVERY 6 HOURS PRN
COMMUNITY
End: 2024-06-10

## 2021-10-30 RX ORDER — PIPERACILLIN SODIUM, TAZOBACTAM SODIUM 3; .375 G/15ML; G/15ML
3.38 INJECTION, POWDER, LYOPHILIZED, FOR SOLUTION INTRAVENOUS ONCE
Status: COMPLETED | OUTPATIENT
Start: 2021-10-30 | End: 2021-10-30

## 2021-10-30 RX ORDER — MORPHINE SULFATE 4 MG/ML
4 INJECTION, SOLUTION INTRAMUSCULAR; INTRAVENOUS ONCE
Status: COMPLETED | OUTPATIENT
Start: 2021-10-30 | End: 2021-10-30

## 2021-10-30 RX ORDER — AMOXICILLIN 250 MG
1 CAPSULE ORAL 2 TIMES DAILY PRN
Status: DISCONTINUED | OUTPATIENT
Start: 2021-10-30 | End: 2021-11-01 | Stop reason: HOSPADM

## 2021-10-30 RX ORDER — CETIRIZINE HYDROCHLORIDE 10 MG/1
10 TABLET ORAL EVERY EVENING
Status: DISCONTINUED | OUTPATIENT
Start: 2021-10-30 | End: 2021-11-01 | Stop reason: HOSPADM

## 2021-10-30 RX ORDER — ACETAMINOPHEN 650 MG/1
650 SUPPOSITORY RECTAL EVERY 6 HOURS PRN
Status: DISCONTINUED | OUTPATIENT
Start: 2021-10-30 | End: 2021-11-01 | Stop reason: HOSPADM

## 2021-10-30 RX ORDER — BUDESONIDE 3 MG/1
3 CAPSULE, COATED PELLETS ORAL EVERY MORNING
Status: DISCONTINUED | OUTPATIENT
Start: 2021-10-31 | End: 2021-11-01 | Stop reason: HOSPADM

## 2021-10-30 RX ORDER — ACETAMINOPHEN 500 MG
500-1000 TABLET ORAL EVERY 6 HOURS PRN
Status: DISCONTINUED | OUTPATIENT
Start: 2021-10-30 | End: 2021-10-30

## 2021-10-30 RX ORDER — ACETAMINOPHEN 325 MG/1
650 TABLET ORAL EVERY 6 HOURS PRN
Status: DISCONTINUED | OUTPATIENT
Start: 2021-10-30 | End: 2021-11-01 | Stop reason: HOSPADM

## 2021-10-30 RX ORDER — SUMATRIPTAN 50 MG/1
50 TABLET, FILM COATED ORAL DAILY PRN
Status: DISCONTINUED | OUTPATIENT
Start: 2021-10-30 | End: 2021-11-01 | Stop reason: HOSPADM

## 2021-10-30 RX ORDER — IOPAMIDOL 755 MG/ML
100 INJECTION, SOLUTION INTRAVASCULAR ONCE
Status: COMPLETED | OUTPATIENT
Start: 2021-10-30 | End: 2021-10-30

## 2021-10-30 RX ORDER — SODIUM CHLORIDE 9 MG/ML
INJECTION, SOLUTION INTRAVENOUS ONCE
Status: COMPLETED | OUTPATIENT
Start: 2021-10-30 | End: 2021-10-30

## 2021-10-30 RX ORDER — BISACODYL 10 MG
10 SUPPOSITORY, RECTAL RECTAL DAILY PRN
Status: DISCONTINUED | OUTPATIENT
Start: 2021-10-30 | End: 2021-11-01 | Stop reason: HOSPADM

## 2021-10-30 RX ORDER — ONDANSETRON 4 MG/1
4 TABLET, ORALLY DISINTEGRATING ORAL EVERY 6 HOURS PRN
Status: DISCONTINUED | OUTPATIENT
Start: 2021-10-30 | End: 2021-11-01 | Stop reason: HOSPADM

## 2021-10-30 RX ORDER — ALBUTEROL SULFATE 90 UG/1
2 AEROSOL, METERED RESPIRATORY (INHALATION) 4 TIMES DAILY PRN
Status: DISCONTINUED | OUTPATIENT
Start: 2021-10-30 | End: 2021-11-01 | Stop reason: HOSPADM

## 2021-10-30 RX ORDER — FENTANYL CITRATE 50 UG/ML
INJECTION, SOLUTION INTRAMUSCULAR; INTRAVENOUS DAILY PRN
Status: COMPLETED | OUTPATIENT
Start: 2021-10-30 | End: 2021-10-30

## 2021-10-30 RX ADMIN — SODIUM CHLORIDE: 9 INJECTION, SOLUTION INTRAVENOUS at 13:20

## 2021-10-30 RX ADMIN — IOPAMIDOL 100 ML: 755 INJECTION, SOLUTION INTRAVENOUS at 10:54

## 2021-10-30 RX ADMIN — MIDAZOLAM HYDROCHLORIDE 1 MG: 1 INJECTION, SOLUTION INTRAMUSCULAR; INTRAVENOUS at 16:55

## 2021-10-30 RX ADMIN — FENTANYL CITRATE 50 MCG: 50 INJECTION, SOLUTION INTRAMUSCULAR; INTRAVENOUS at 16:55

## 2021-10-30 RX ADMIN — AMITRIPTYLINE HYDROCHLORIDE 25 MG: 25 TABLET, FILM COATED ORAL at 21:46

## 2021-10-30 RX ADMIN — PIPERACILLIN AND TAZOBACTAM 3.38 G: 3; .375 INJECTION, POWDER, LYOPHILIZED, FOR SOLUTION INTRAVENOUS at 11:17

## 2021-10-30 RX ADMIN — FENTANYL CITRATE 50 MCG: 50 INJECTION, SOLUTION INTRAMUSCULAR; INTRAVENOUS at 16:49

## 2021-10-30 RX ADMIN — MORPHINE SULFATE 4 MG: 4 INJECTION INTRAVENOUS at 17:40

## 2021-10-30 RX ADMIN — PIPERACILLIN AND TAZOBACTAM 3.38 G: 3; .375 INJECTION, POWDER, LYOPHILIZED, FOR SOLUTION INTRAVENOUS at 21:02

## 2021-10-30 RX ADMIN — MIDAZOLAM HYDROCHLORIDE 1 MG: 1 INJECTION, SOLUTION INTRAMUSCULAR; INTRAVENOUS at 16:49

## 2021-10-30 RX ADMIN — CETIRIZINE HYDROCHLORIDE 10 MG: 10 TABLET, FILM COATED ORAL at 21:46

## 2021-10-30 RX ADMIN — OXYCODONE HYDROCHLORIDE 5 MG: 5 TABLET ORAL at 21:02

## 2021-10-30 ASSESSMENT — ACTIVITIES OF DAILY LIVING (ADL)
DEPENDENT_IADLS:: INDEPENDENT
ADLS_ACUITY_SCORE: 5
DIFFICULTY_COMMUNICATING: NO
FALL_HISTORY_WITHIN_LAST_SIX_MONTHS: NO
ADLS_ACUITY_SCORE: 3
WEAR_GLASSES_OR_BLIND: NO
DEPENDENT_IADLS:: INDEPENDENT
DOING_ERRANDS_INDEPENDENTLY_DIFFICULTY: NO
ADLS_ACUITY_SCORE: 5
TOILETING_ISSUES: NO
ADLS_ACUITY_SCORE: 5
ADLS_ACUITY_SCORE: 3
ADLS_ACUITY_SCORE: 5
WALKING_OR_CLIMBING_STAIRS_DIFFICULTY: NO
CONCENTRATING,_REMEMBERING_OR_MAKING_DECISIONS_DIFFICULTY: NO
HEARING_DIFFICULTY_OR_DEAF: NO
DIFFICULTY_EATING/SWALLOWING: NO
ADLS_ACUITY_SCORE: 5
ADLS_ACUITY_SCORE: 5
DRESSING/BATHING_DIFFICULTY: NO
ADLS_ACUITY_SCORE: 5

## 2021-10-30 ASSESSMENT — MIFFLIN-ST. JEOR
SCORE: 1165.61
SCORE: 1191.69

## 2021-10-30 NOTE — ED TRIAGE NOTES
Patient is here with a post op cholecystectomy. She did see the surgeon on Monday and was told everything looked good. Today she itched the area and it noted bleeding to the 4x4 dressing.

## 2021-10-30 NOTE — ED PROVIDER NOTES
2:45 PM - Patient boarding in the ED for IR drain of peritoneal abscess with subsequent admission on IV Zosyn. No beds available here at [facility] currently or expected overnight into tomorrow. Bed board thus contacted and med/srg bed found at [facility] for admission after IR drain placement here at [facility]. Consulted hospitalist at [facility] (Dr. Moore) for admission; they agreed. Patient understood and agreed with the plan; no further questions at this time.      IMPRESSION:    ICD-10-CM    1. Peritoneal abscess (H)  K65.1    2. Postoperative intra-abdominal abscess  T81.43XA    3. Anemia, unspecified type  D64.9            Philippe Anderson MD  10/30/21  Emergency Medicine  [facility] EMERGENCY ROOM  [address] [place] [id number]  [phone]  Dept: [phone]     Philippe Anderson MD  10/30/21 5210

## 2021-10-30 NOTE — CONSULTS
Care Management Initial Consult    General Information  Assessment completed with: Patient,    Type of CM/SW Visit: Initial Assessment    Primary Care Provider verified and updated as needed: Yes   Readmission within the last 30 days: no previous admission in last 30 days      Reason for Consult: discharge planning  Advance Care Planning:            Communication Assessment  Patient's communication style: spoken language (English or Bilingual)    Hearing Difficulty or Deaf: no   Wear Glasses or Blind: no    Cognitive  Cognitive/Neuro/Behavioral: WDL                      Living Environment:   People in home: parent(s)     Current living Arrangements: house      Able to return to prior arrangements: yes       Family/Social Support:  Care provided by: self  Provides care for: no one  Marital Status: Single  Parent(s)          Description of Support System: Involved, Supportive    Support Assessment: Adequate social supports    Current Resources:   Patient receiving home care services: No     Community Resources: None  Equipment currently used at home: none  Supplies currently used at home: None    Employment/Financial:  Employment Status:          Financial Concerns: No concerns identified   Referral to Financial Counselor: No       Lifestyle & Psychosocial Needs:  Social Determinants of Health     Tobacco Use: Low Risk      Smoking Tobacco Use: Never Smoker     Smokeless Tobacco Use: Never Used   Alcohol Use:      Frequency of Alcohol Consumption:      Average Number of Drinks:      Frequency of Binge Drinking:    Financial Resource Strain:      Difficulty of Paying Living Expenses:    Food Insecurity:      Worried About Running Out of Food in the Last Year:      Ran Out of Food in the Last Year:    Transportation Needs:      Lack of Transportation (Medical):      Lack of Transportation (Non-Medical):    Physical Activity:      Days of Exercise per Week:      Minutes of Exercise per Session:    Stress:      Feeling of  Stress :    Social Connections:      Frequency of Communication with Friends and Family:      Frequency of Social Gatherings with Friends and Family:      Attends Yazidism Services:      Active Member of Clubs or Organizations:      Attends Club or Organization Meetings:      Marital Status:    Intimate Partner Violence:      Fear of Current or Ex-Partner:      Emotionally Abused:      Physically Abused:      Sexually Abused:    Depression:      PHQ-2 Score:    Housing Stability:      Unable to Pay for Housing in the Last Year:      Number of Places Lived in the Last Year:      Unstable Housing in the Last Year:        Functional Status:  Prior to admission patient needed assistance:   Dependent ADLs:: Independent  Dependent IADLs:: Independent  Assesssment of Functional Status: At functional baseline    Mental Health Status:  Mental Health Status: No Current Concerns       Chemical Dependency Status:  Chemical Dependency Status: No Current Concerns             Values/Beliefs:  Spiritual, Cultural Beliefs, Yazidism Practices, Values that affect care: no               Additional Information:  EBONY assessed. Pt resides at home with her family, she is independent with ADLs/IADLs at baseline and does not require services or equipment. Discharge plan to return home, no CM needs anticipated.     Batool Shrestha LGSW

## 2021-10-30 NOTE — PROCEDURES
[facility]    Procedure: IR Procedure Note    Date/Time: 10/30/2021 5:07 PM  Performed by: Naveen Oliveira MD  Authorized by: Naveen Oliveira MD     UNIVERSAL PROTOCOL   Site Marked: NA  Prior Images Obtained and Reviewed:  Yes  Required items: Required blood products, implants, devices and special equipment available    Patient identity confirmed:  Verbally with patient, arm band, provided demographic data and hospital-assigned identification number  Patient was reevaluated immediately before administering moderate or deep sedation or anesthesia  Confirmation Checklist:  Patient's identity using two indicators, relevant allergies, procedure was appropriate and matched the consent or emergent situation and correct equipment/implants were available  Time out: Immediately prior to the procedure a time out was called    Universal Protocol: the Joint Commission Universal Protocol was followed    Preparation: Patient was prepped and draped in usual sterile fashion           ANESTHESIA    Anesthesia: Local infiltration  Local Anesthetic:  Lidocaine 1% without epinephrine      SEDATION    Patient Sedated: Yes    Sedation Type:  Moderate (conscious) sedation  Sedation:  Fentanyl and midazolam  Vital signs: Vital signs monitored during sedation    See dictated procedure note for full details.  Findings: Abd fluid collection identified.  With sheathed needle in place-very little fluid returned.  Wire passed and drain placed.    Specimens: none and fluid and/or tissue for gram stain and culture    Complications: None    Condition: Stable    Plan: Await labs.  Repeat CT in 1-2 days to eval drain.  Cavity small and difficult to access    PROCEDURE   Patient Tolerance:  Patient tolerated the procedure well with no immediate complications  Describe Procedure: 10 fr Drain  Length of time physician/provider present for 1:1 monitoring during sedation: 15

## 2021-10-30 NOTE — PRE-PROCEDURE
GENERAL PRE-PROCEDURE:   Procedure:  Drain    Verbal consent obtained?: Yes    Written consent obtained?: Yes    Risks and benefits: Risks, benefits and alternatives were discussed    Consent given by:  Patient  Patient states understanding of procedure being performed: Yes    Patient's understanding of procedure matches consent: Yes    Procedure consent matches procedure scheduled: Yes    Expected level of sedation:  Moderate  Appropriately NPO:  Yes  ASA Class:  2  Mallampati  :  Grade 1- soft palate, uvula, tonsillar pillars, and posterior pharyngeal wall visible  Lungs:  Lungs clear with good breath sounds bilaterally  Heart:  Normal heart sounds and rate  History & Physical reviewed:  History and physical reviewed and no updates needed  Statement of review:  I have reviewed the lab findings, diagnostic data, medications, and the plan for sedation

## 2021-10-30 NOTE — ED PROVIDER NOTES
EMERGENCY DEPARTMENT ENCOUNTER      NAME: Vira Fallon  AGE: 56 year old female  YOB: 1965  MRN: [id number]  EVALUATION DATE & TIME: 10/30/2021  9:54 AM    PCP: Barbie Drew    ED PROVIDER: Isabella Coy MD    Chief Complaint   Patient presents with     Post-op Problem       FINAL IMPRESSION:  1. Peritoneal abscess (H)    2. Postoperative intra-abdominal abscess    3. Anemia, unspecified type        ED COURSE & MEDICAL DECISION MAKING:    Pertinent Labs & Imaging studies reviewed. (See chart for details)  56 year old female with history of asthma POD #12 from laparoscopic cystectomy for gallstones and cholecystitis who presents to the Emergency Department for evaluation of persistent right upper quadrant abdominal pain poorly controlled since surgery and spontaneous drainage from her previous RULA site today.  On exam she is draining slightly purulent bloody drainage from the RULA site.  And has significant amount of right upper quadrant abdominal pain.  Review of her operative note she had abscess between the gallbladder and the omentum and after significant irrigation felt not to need antibiotics for home.  Strong concern for intraperitoneal abscess, seroma, hematoma.  Less likely pancreatitis, retained stone.    Patient placed on monitor, IV established and blood obtained.  Declines any analgesia at this time.  CBC, CMP, lipase, lactate notable for hemoglobin of 8.3 down from 9.5 postop.  Urinalysis unremarkable.  CT abdomen pelvis abscess in the gallbladder fossa that extends to the abdominal wall at the laparoscopic tract site.  Given dose of Zosyn.  Pain remains controlled at this time.  Case discussed with general surgery who recommends IR guided drain placement.  Typically we do not have IR here at [facility] on the weekends, but they are coming for other procedure.  Dr. Raman graciously willing to perform ultrasound-guided drain placement today.       ED Course as of Oct 30 1346   Sat  Oct 30, 2021   0958 I met with the patient and performed my initial exam. Eye protection and surgical mask were worn during this encounter.      1030 Down from 9.5 post op    Hemoglobin(!): 8.3   1047 I rechecked and updated the patient regarding lab results.       1124 Radiology called regarding CT results.      1144 I talked to Dr. Du, General Surgery, regarding the patient.      1157 I talked to Dr. Raman, IR, regarding the patient.       1207 I talked to Dr. Raman IR, regarding the patient. He will come to the emergency department and place the drain.       1240 I rechecked and updated the patient regarding plan for ultrasound-guided drain placement today while in the emergency department. She verbalized agreement with the plan.      1345 Admitted to [facility].           At the conclusion of the encounter I discussed the results of all of the tests and the disposition. The questions were answered. The patient or family acknowledged understanding and was agreeable with the care plan.    CONSULTS:  Dr. Du - General Surgery  Dr. Raman - IR        MEDICATIONS GIVEN IN THE EMERGENCY:  Medications   iopamidol (ISOVUE-370) solution 100 mL (100 mLs Intravenous Given 10/30/21 1054)   piperacillin-tazobactam (ZOSYN) 3.375 g vial to attach to  mL bag (0 g Intravenous Stopped 10/30/21 1207)   sodium chloride 0.9% infusion ( Intravenous New Bag 10/30/21 1320)       NEW PRESCRIPTIONS STARTED AT TODAY'S ER VISIT  New Prescriptions    No medications on file       =================================================================    HPI    Patient information was obtained from: The patient.    Use of Intrepreter: N/A     Vira Fallon is a 56 year old female with pertinent medical history of asthma, celiac disease, colitis, and cholecystectomy who presents for evaluation of post-operative problem.    Per chart review, patient was admitted to [facility] on 10/18/2021 for a laparoscopic  cholecystectomy by Dr. Ba. She was found to have severe cholecystitis with pericholecystic abscess. Patient had copious amounts of RULA drainage.  She has had ~500cc of output in addition to saturated dripping wet dressing changes x3. Hemoglobin was 9.5. She was discharged home with the RULA drain in place.     Patient presents with right upper quadrant abdominal pain and bleeding/drainage from RULA drain site, which was removed one week ago. She notes the abdominal pain radiates to her back. Last night, the patient denies any significant bleeding or drainage from the site. But when she woke up this morning, the pain had increased in severity and was intolerable. She noticed significant bleeding as well, which concerned her. Percocet and Vicodin provided no pain relief. Deep breathing exacerbates the pain. The pain has now mostly resolved while in the emergency department and rates the pain at 2/10. Denies fever, chills, or any other complaints at this time.      REVIEW OF SYSTEMS  Constitutional:  Denies fever, chills, weight loss or weakness  Respiratory: No SOB, wheeze or cough  Cardiovascular:  No CP, palpitations  GI:  Denies nausea, vomiting, diarrhea. Positive for abdominal pain and drainage from RULA site at right upper quadrant.  : Denies dysuria, denies hematuria  Musculoskeletal:  Denies any new muscle/joint pain, swelling or loss of function. Positive for back pain.  All other systems negative unless noted in HPI.      PAST MEDICAL HISTORY:  Past Medical History:   Diagnosis Date     Celiac disease      Colitis      Uncomplicated asthma        PAST SURGICAL HISTORY:  Past Surgical History:   Procedure Laterality Date     COLONOSCOPY       LAPAROSCOPIC CHOLECYSTECTOMY N/A 10/18/2021    Procedure: LAPAROSCOPIC CHOLECYSTECTOMY;  Surgeon: Steve Ba MD;  Location: [facility] OR     ORTHOPEDIC SURGERY         CURRENT MEDICATIONS:    Prior to Admission Medications   Prescriptions Last Dose  Informant Patient Reported? Taking?   HYDROcodone-Acetaminophen (VICODIN) 5-300 MG TABS 10/30/2021 at 1/2 tab 12 am and 4am  Yes Yes   Sig: Take 1 tablet by mouth every 6 hours as needed (for pain)   SUMAtriptan (IMITREX) 50 MG tablet 10/26/2021  Yes No   Sig: Take 50 mg by mouth at onset of headache for migraine   acetaminophen (TYLENOL) 500 MG tablet 10/30/2021 at 2 tabs 7AM  Yes Yes   Sig: Take 500-1,000 mg by mouth every 6 hours as needed for mild pain   albuterol (PROAIR HFA/PROVENTIL HFA/VENTOLIN HFA) 108 (90 Base) MCG/ACT inhaler  at prn  Yes Yes   Sig: Inhale 2 puffs into the lungs 4 times daily as needed    amitriptyline (ELAVIL) 25 MG tablet 10/29/2021 at PM  Yes Yes   Sig: Take 25 mg by mouth At Bedtime   budesonide (ENTOCORT EC) 3 MG EC capsule 10/29/2021 at AM  Yes Yes   Sig: Take 3 mg by mouth every morning    cetirizine (ZYRTEC) 10 MG tablet 10/29/2021 at PM  Yes Yes   Sig: Take 10 mg by mouth daily   fluticasone (FLONASE) 50 MCG/ACT nasal spray  at prn  Yes Yes   Sig: Spray 2 sprays into both nostrils daily   melatonin (MELATONIN) 1 MG/ML LIQD liquid 10/29/2021 at PM  Yes Yes   Sig: Take 3 mg by mouth nightly as needed for sleep    ondansetron (ZOFRAN-ODT) 8 MG ODT tab 10/26/2021  Yes Yes   Sig: Take 8 mg by mouth every 8 hours as needed for nausea      Facility-Administered Medications: None       ALLERGIES:  Allergies   Allergen Reactions     Animal Dander Itching and Shortness Of Breath     Dust Mites Unknown     Mold Unknown     Perfume Headache and Itching       FAMILY HISTORY:  No family history on file.    SOCIAL HISTORY:  Social History     Tobacco Use     Smoking status: Never Smoker     Smokeless tobacco: Never Used   Vaping Use     Vaping Use: Never used   Substance Use Topics     Alcohol use: Not Currently     Comment: 1 x per year     Drug use: Never        VITALS:  Patient Vitals for the past 24 hrs:   BP Temp Pulse Resp SpO2 Height Weight   10/30/21 1300 113/59 -- 88 -- 100 % -- --    10/30/21 1200 124/58 -- 94 -- 99 % -- --   10/30/21 1120 124/58 -- 95 -- 99 % -- --   10/30/21 1030 114/56 -- 93 -- 100 % -- --   10/30/21 1000 115/81 98.2  F (36.8  C) 115 16 100 % -- --   10/30/21 0957 -- -- -- -- -- 1.524 m (5') 64.4 kg (142 lb)       PHYSICAL EXAM    General Appearance: Well-appearing, well-nourished, no acute distress   Head:  Normocephalic  Eyes:   conjunctiva/corneas clear  ENT:  membranes are moist without pallor  Neck:  Supple  Cardio:  Regular rate and rhythm, no murmur/gallop/rub  Pulm:  No respiratory distress, clear to auscultation bilaterally  Back: No CVA tenderness, normal ROM  Abdomen:  Soft, non distended, no rebound. Epigastric and umbilical incision are clean and dry. Right upper quadrant RULA site spontaneously draining purulent bloody discharge. Significant right upper quadrant tenderness with guarding.   Extremities: Moves all extremities normally, normal gait  Skin:  Skin warm, dry, no rashes  Neuro:  Alert and oriented ×3, moving all extremities, no gross sensory defects     RADIOLOGY/LABS:  Reviewed all pertinent imaging. Please see official radiology report. All pertinent labs reviewed and interpreted.    Results for orders placed or performed during the hospital encounter of 10/30/21   CT Abdomen Pelvis w Contrast    Impression    IMPRESSION:   1.  Rim-enhancing fluid collection containing some air and debris in the gallbladder fossa extending along the right lateral abdominal wall inferior to the liver to the site of presumed laparoscopic tract through the right lateral abdominal wall the   enhancement and fluid extend through the abdominal wall musculature and subcutaneous fat. Findings are consistent with an abscess in the gallbladder fossa.  2.  Findings were called to Dr. Weston at the time of dictation.    NOTE: ABNORMAL REPORT    THE DICTATION ABOVE DESCRIBES AN ABNORMALITY FOR WHICH FOLLOW-UP IS NEEDED.    Comprehensive metabolic panel   Result Value Ref Range     Sodium 138 136 - 145 mmol/L    Potassium 3.4 (L) 3.5 - 5.0 mmol/L    Chloride 102 98 - 107 mmol/L    Carbon Dioxide (CO2) 24 22 - 31 mmol/L    Anion Gap 12 5 - 18 mmol/L    Urea Nitrogen 9 8 - 22 mg/dL    Creatinine 0.76 0.60 - 1.10 mg/dL    Calcium 9.1 8.5 - 10.5 mg/dL    Glucose 126 (H) 70 - 125 mg/dL    Alkaline Phosphatase 100 45 - 120 U/L    AST 12 0 - 40 U/L    ALT 10 0 - 45 U/L    Protein Total 6.8 6.0 - 8.0 g/dL    Albumin 2.7 (L) 3.5 - 5.0 g/dL    Bilirubin Total 0.5 0.0 - 1.0 mg/dL    GFR Estimate 89 >60 mL/min/1.73m2   Result Value Ref Range    Lipase 13 0 - 52 U/L   Lactic acid whole blood   Result Value Ref Range    Lactic Acid 1.5 0.7 - 2.0 mmol/L   UA with Microscopic reflex to Culture    Specimen: Urine, Midstream   Result Value Ref Range    Color Urine Colorless Colorless, Straw, Light Yellow, Yellow    Appearance Urine Clear Clear    Glucose Urine Negative Negative mg/dL    Bilirubin Urine Negative Negative    Ketones Urine Negative Negative mg/dL    Specific Gravity Urine 1.029 1.001 - 1.030    Blood Urine Negative Negative    pH Urine 6.5 5.0 - 7.0    Protein Albumin Urine Negative Negative mg/dL    Urobilinogen Urine <2.0 <2.0 mg/dL    Nitrite Urine Negative Negative    Leukocyte Esterase Urine Negative Negative    RBC Urine 0 <=2 /HPF    WBC Urine <1 <=5 /HPF    Squamous Epithelials Urine 1 <=1 /HPF   CBC with platelets and differential   Result Value Ref Range    WBC Count 10.0 4.0 - 11.0 10e3/uL    RBC Count 2.76 (L) 3.80 - 5.20 10e6/uL    Hemoglobin 8.3 (L) 11.7 - 15.7 g/dL    Hematocrit 26.3 (L) 35.0 - 47.0 %    MCV 95 78 - 100 fL    MCH 30.1 26.5 - 33.0 pg    MCHC 31.6 31.5 - 36.5 g/dL    RDW 13.2 10.0 - 15.0 %    Platelet Count 507 (H) 150 - 450 10e3/uL    % Neutrophils 80 %    % Lymphocytes 11 %    % Monocytes 7 %    % Eosinophils 1 %    % Basophils 0 %    % Immature Granulocytes 1 %    NRBCs per 100 WBC 0 <1 /100    Absolute Neutrophils 8.1 1.6 - 8.3 10e3/uL    Absolute Lymphocytes 1.1  0.8 - 5.3 10e3/uL    Absolute Monocytes 0.7 0.0 - 1.3 10e3/uL    Absolute Eosinophils 0.1 0.0 - 0.7 10e3/uL    Absolute Basophils 0.0 0.0 - 0.2 10e3/uL    Absolute Immature Granulocytes 0.1 (H) <=0.0 10e3/uL    Absolute NRBCs 0.0 10e3/uL   Asymptomatic COVID-19 Virus (Coronavirus) by PCR Nasopharyngeal    Specimen: Nasopharyngeal; Swab   Result Value Ref Range    SARS CoV2 PCR Negative Negative           The creation of this record is based on the scribe s observations of the work being performed by Isabella Coy MD and the provider s statements to them. It was created on his behalf by Maricel Spann, a trained medical scribe. This document has been checked and approved by the attending provider.    Isabella Coy MD  Emergency Medicine  [facility] EMERGENCY ROOM  [address], [place] [id number] [phone]  Dept: [phone]     Isabella Coy MD  10/30/21 8322

## 2021-10-30 NOTE — PHARMACY-ADMISSION MEDICATION HISTORY
Pharmacy Note - Admission Medication History    Pertinent Provider Information:      ______________________________________________________________________    Prior To Admission (PTA) med list completed and updated in EMR.       PTA Med List   Medication Sig Last Dose     acetaminophen (TYLENOL) 500 MG tablet Take 500-1,000 mg by mouth every 6 hours as needed for mild pain 10/30/2021 at 2 tabs 7AM     albuterol (PROAIR HFA/PROVENTIL HFA/VENTOLIN HFA) 108 (90 Base) MCG/ACT inhaler Inhale 2 puffs into the lungs 4 times daily as needed   at prn     amitriptyline (ELAVIL) 25 MG tablet Take 25 mg by mouth At Bedtime 10/29/2021 at PM     budesonide (ENTOCORT EC) 3 MG EC capsule Take 3 mg by mouth every morning  10/29/2021 at AM     cetirizine (ZYRTEC) 10 MG tablet Take 10 mg by mouth daily 10/29/2021 at PM     fluticasone (FLONASE) 50 MCG/ACT nasal spray Spray 2 sprays into both nostrils daily  at prn     HYDROcodone-Acetaminophen (VICODIN) 5-300 MG TABS Take 1 tablet by mouth every 6 hours as needed (for pain) 10/30/2021 at 1/2 tab 12 am and 4am     melatonin (MELATONIN) 1 MG/ML LIQD liquid Take 3 mg by mouth nightly as needed for sleep  10/29/2021 at PM     ondansetron (ZOFRAN-ODT) 8 MG ODT tab Take 8 mg by mouth every 8 hours as needed for nausea 10/26/2021       Information source(s): Patient and CareEverywhere/[facility]  Method of interview communication: in-person    Summary of Changes to PTA Med List  New: Vicodin 5-300, tylenol, zofran 8mg  Discontinued: N/A  Changed: melatonin from 2 to 3 mg, budesonide was taking 2 capsules daily now taking 1 capsule (3mg)    Patient was asked about OTC/herbal products specifically.  PTA med list reflects this.    In the past week, patient estimated taking medication this percent of the time:  greater than 90%.    Allergies were reviewed, assessed, and updated with the patient.      Patient does not anticipate needing any multi-use medications during admission.    The  information provided in this note is only as accurate as the sources available at the time of the update(s).    Thank you for the opportunity to participate in the care of this patient.    Lesvia Weston  10/30/2021 10:57 AM

## 2021-10-31 LAB
ANION GAP SERPL CALCULATED.3IONS-SCNC: 7 MMOL/L (ref 5–18)
BUN SERPL-MCNC: 7 MG/DL (ref 8–22)
CALCIUM SERPL-MCNC: 8.7 MG/DL (ref 8.5–10.5)
CHLORIDE BLD-SCNC: 105 MMOL/L (ref 98–107)
CO2 SERPL-SCNC: 27 MMOL/L (ref 22–31)
CREAT SERPL-MCNC: 0.75 MG/DL (ref 0.6–1.1)
ERYTHROCYTE [DISTWIDTH] IN BLOOD BY AUTOMATED COUNT: 13.1 % (ref 10–15)
GFR SERPL CREATININE-BSD FRML MDRD: >90 ML/MIN/1.73M2
GLUCOSE BLD-MCNC: 91 MG/DL (ref 70–125)
HCT VFR BLD AUTO: 24.2 % (ref 35–47)
HGB BLD-MCNC: 7.3 G/DL (ref 11.7–15.7)
MAGNESIUM SERPL-MCNC: 1.9 MG/DL (ref 1.8–2.6)
MCH RBC QN AUTO: 29.3 PG (ref 26.5–33)
MCHC RBC AUTO-ENTMCNC: 30.2 G/DL (ref 31.5–36.5)
MCV RBC AUTO: 97 FL (ref 78–100)
PLATELET # BLD AUTO: 390 10E3/UL (ref 150–450)
POTASSIUM BLD-SCNC: 3.7 MMOL/L (ref 3.5–5)
RBC # BLD AUTO: 2.49 10E6/UL (ref 3.8–5.2)
SODIUM SERPL-SCNC: 139 MMOL/L (ref 136–145)
WBC # BLD AUTO: 6.2 10E3/UL (ref 4–11)

## 2021-10-31 PROCEDURE — 85027 COMPLETE CBC AUTOMATED: CPT | Performed by: HOSPITALIST

## 2021-10-31 PROCEDURE — 36415 COLL VENOUS BLD VENIPUNCTURE: CPT | Performed by: HOSPITALIST

## 2021-10-31 PROCEDURE — 250N000013 HC RX MED GY IP 250 OP 250 PS 637: Performed by: HOSPITALIST

## 2021-10-31 PROCEDURE — 80048 BASIC METABOLIC PNL TOTAL CA: CPT | Performed by: HOSPITALIST

## 2021-10-31 PROCEDURE — 120N000001 HC R&B MED SURG/OB

## 2021-10-31 PROCEDURE — 83735 ASSAY OF MAGNESIUM: CPT | Performed by: HOSPITALIST

## 2021-10-31 PROCEDURE — 99231 SBSQ HOSP IP/OBS SF/LOW 25: CPT | Performed by: INTERNAL MEDICINE

## 2021-10-31 PROCEDURE — 250N000011 HC RX IP 250 OP 636: Performed by: HOSPITALIST

## 2021-10-31 RX ADMIN — PIPERACILLIN AND TAZOBACTAM 3.38 G: 3; .375 INJECTION, POWDER, LYOPHILIZED, FOR SOLUTION INTRAVENOUS at 04:09

## 2021-10-31 RX ADMIN — BUDESONIDE 3 MG: 3 CAPSULE ORAL at 08:33

## 2021-10-31 RX ADMIN — HYDROMORPHONE HYDROCHLORIDE 0.4 MG: 0.2 INJECTION, SOLUTION INTRAMUSCULAR; INTRAVENOUS; SUBCUTANEOUS at 00:26

## 2021-10-31 RX ADMIN — AMITRIPTYLINE HYDROCHLORIDE 25 MG: 25 TABLET, FILM COATED ORAL at 20:35

## 2021-10-31 RX ADMIN — ALUMINUM HYDROXIDE, MAGNESIUM HYDROXIDE, AND SIMETHICONE 30 ML: 200; 200; 20 SUSPENSION ORAL at 20:31

## 2021-10-31 RX ADMIN — CETIRIZINE HYDROCHLORIDE 10 MG: 10 TABLET, FILM COATED ORAL at 20:35

## 2021-10-31 RX ADMIN — ENOXAPARIN SODIUM 40 MG: 40 INJECTION SUBCUTANEOUS at 10:55

## 2021-10-31 RX ADMIN — OXYCODONE HYDROCHLORIDE 5 MG: 5 TABLET ORAL at 08:32

## 2021-10-31 RX ADMIN — SUMATRIPTAN SUCCINATE 50 MG: 50 TABLET ORAL at 12:47

## 2021-10-31 RX ADMIN — PIPERACILLIN AND TAZOBACTAM 3.38 G: 3; .375 INJECTION, POWDER, LYOPHILIZED, FOR SOLUTION INTRAVENOUS at 12:33

## 2021-10-31 RX ADMIN — ONDANSETRON 4 MG: 2 INJECTION INTRAMUSCULAR; INTRAVENOUS at 08:28

## 2021-10-31 RX ADMIN — OXYCODONE HYDROCHLORIDE 5 MG: 5 TABLET ORAL at 04:11

## 2021-10-31 RX ADMIN — PIPERACILLIN AND TAZOBACTAM 3.38 G: 3; .375 INJECTION, POWDER, LYOPHILIZED, FOR SOLUTION INTRAVENOUS at 20:32

## 2021-10-31 ASSESSMENT — ACTIVITIES OF DAILY LIVING (ADL)
ADLS_ACUITY_SCORE: 3
ADLS_ACUITY_SCORE: 5
ADLS_ACUITY_SCORE: 5
ADLS_ACUITY_SCORE: 3
ADLS_ACUITY_SCORE: 3
ADLS_ACUITY_SCORE: 5
ADLS_ACUITY_SCORE: 3
ADLS_ACUITY_SCORE: 5
ADLS_ACUITY_SCORE: 3
ADLS_ACUITY_SCORE: 3
ADLS_ACUITY_SCORE: 5
ADLS_ACUITY_SCORE: 3
ADLS_ACUITY_SCORE: 5
ADLS_ACUITY_SCORE: 3
ADLS_ACUITY_SCORE: 3
ADLS_ACUITY_SCORE: 5
ADLS_ACUITY_SCORE: 3

## 2021-10-31 NOTE — CONSULTS
Care Management Initial Consult    General Information  Assessment completed with: Patient,    Type of CM/SW Visit: Offer D/C Planning    Primary Care Provider verified and updated as needed: Yes   Readmission within the last 30 days:        Reason for Consult: discharge planning  Advance Care Planning:            Communication Assessment  Patient's communication style: spoken language (English or Bilingual)    Hearing Difficulty or Deaf: no   Wear Glasses or Blind: no    Cognitive  Cognitive/Neuro/Behavioral: WDL                      Living Environment:   People in home: parent(s)     Current living Arrangements: house      Able to return to prior arrangements: yes       Family/Social Support:  Care provided by: self  Provides care for: parent(s) (Father )  Marital Status: Single             Description of Support System: No concerns        Current Resources:   Patient receiving home care services: No     Community Resources: None  Equipment currently used at home: none  Supplies currently used at home: None    Employment/Financial:  Employment Status: employed full-time        Financial Concerns: No concerns identified   Referral to Financial Counselor: No       Lifestyle & Psychosocial Needs:  Social Determinants of Health     Tobacco Use: Low Risk      Smoking Tobacco Use: Never Smoker     Smokeless Tobacco Use: Never Used   Alcohol Use:      Frequency of Alcohol Consumption:      Average Number of Drinks:      Frequency of Binge Drinking:    Financial Resource Strain:      Difficulty of Paying Living Expenses:    Food Insecurity:      Worried About Running Out of Food in the Last Year:      Ran Out of Food in the Last Year:    Transportation Needs:      Lack of Transportation (Medical):      Lack of Transportation (Non-Medical):    Physical Activity:      Days of Exercise per Week:      Minutes of Exercise per Session:    Stress:      Feeling of Stress :    Social Connections:      Frequency of Communication  with Friends and Family:      Frequency of Social Gatherings with Friends and Family:      Attends Christian Services:      Active Member of Clubs or Organizations:      Attends Club or Organization Meetings:      Marital Status:    Intimate Partner Violence:      Fear of Current or Ex-Partner:      Emotionally Abused:      Physically Abused:      Sexually Abused:    Depression:      PHQ-2 Score:    Housing Stability:      Unable to Pay for Housing in the Last Year:      Number of Places Lived in the Last Year:      Unstable Housing in the Last Year:        Functional Status:  Prior to admission patient needed assistance:   Dependent ADLs:: Independent  Dependent IADLs:: Independent       Mental Health Status:  Mental Health Status: No Current Concerns       Chemical Dependency Status:  Chemical Dependency Status: No Current Concerns             Values/Beliefs:  Spiritual, Cultural Beliefs, Christian Practices, Values that affect care: no               Additional Information:  SWCM met and introduced self and CM services to Pt.  Pt lives with Father and cares for him.  Pt independent at baseline.  Pt drives and walks with no devices.  No CM needs identified or desired.  Family to transport.     JASWINDER Sommers

## 2021-10-31 NOTE — PROGRESS NOTES
[facility]    PROGRESS NOTE - Hospitalist Service    Assessment and Plan    Principal Problem:    Postoperative intra-abdominal abscess  Active Problems:    Celiac disease    Extrinsic asthma    Collagenous Colitis    Vira Fallon is a 56 year old old female s/p laparoscopic cholecystectomy approximately  2 weeks prior.  At that time she had an abscess accompanying acutely inflamed gallbladder, RULA drain left in place which was since been removed.  She developed increasing abdominal pain and presented to the emergency room yesterday.  She began to self decompress through the RULA site at that time.       Gallbladder fossa abscess, post-operative after cholecystectomy for severe cholecystitis and abscess   - s/p drain though small cavity and no drainage in bulb.  repeat CT 1-2 days per IR.   -surgery consult   -pain control  - IV zosyn for now   - per nursing who paged surgery in regards to possible stone lorena surgical intervention at this time.      #asthma - albuterol PRN  #collagenous colitis - budesonide  #hypokalemia - replacement per protocol  #celiac - gluten free diet  COVID-19 PCR Results    COVID-19 PCR Results 10/15/21 10/30/21   SARS CoV2 PCR Negative Negative      Comments are available for some flowsheets but are not being displayed.         COVID-19 Antibody Results, Testing for Immunity    COVID-19 Antibody Results, Testing for Immunity   No data to display.            VTE prophylaxis:  Enoxaparin (Lovenox) SQ  DIET: Orders Placed This Encounter      Gluten Free Diet    Drains/Lines: RULA  Weight bearing status: WBAT  Disposition/Barriers to discharge: pending further abx ad repeat imaging   Code Status: Full Code    Subjective:  Pain is significantly improved per patient     PHYSICAL EXAM  Temp:  [97.3  F (36.3  C)-98.6  F (37  C)] 97.9  F (36.6  C)  Pulse:  [] 85  Resp:  [16-19] 16  BP: ()/(50-81) 110/56  SpO2:  [95 %-100 %] 100 %  Wt Readings from Last 1  Encounters:   10/30/21 66.2 kg (146 lb)       Intake/Output Summary (Last 24 hours) at 10/31/2021 0809  Last data filed at 10/31/2021 0634  Gross per 24 hour   Intake 290 ml   Output 35 ml   Net 255 ml      Body mass index is 28.04 kg/m .    Physical Exam  Constitutional:       General: She is not in acute distress.     Appearance: Normal appearance. She is not ill-appearing or toxic-appearing.   HENT:      Head: Normocephalic and atraumatic.   Cardiovascular:      Rate and Rhythm: Normal rate and regular rhythm.      Pulses: Normal pulses.      Heart sounds: Normal heart sounds.   Pulmonary:      Effort: Pulmonary effort is normal.      Breath sounds: Normal breath sounds.   Abdominal:      General: Bowel sounds are normal.      Palpations: Abdomen is soft.      Comments: RULA drain on right mild tenderness bilious drainage    Musculoskeletal:         General: Normal range of motion.   Skin:     General: Skin is warm and dry.   Neurological:      General: No focal deficit present.      Mental Status: She is alert and oriented to person, place, and time. Mental status is at baseline.         PERTINENT LABS/IMAGING:  No results found for this visit on 10/30/21.      Recent Labs   Lab Test 11/24/20  1041   CHOL 181   HDL 64      TRIG 55     Recent Labs   Lab Test 11/24/20  1041        Recent Labs   Lab Test 10/31/21  0553      POTASSIUM 3.7   CHLORIDE 105   CO2 27   GLC 91   BUN 7*   CR 0.75   GFRESTIMATED >90   ROBERTA 8.7     Recent Labs   Lab Test 06/16/21  0728   A1C 5.2     Recent Labs   Lab Test 10/31/21  0553 10/30/21  1011 10/18/21  1854   HGB 7.3* 8.3* 9.5*     No results for input(s): TROPONINI in the last 84973 hours.  No results for input(s): BNP, NTBNPI, NTBNP in the last 82721 hours.  Recent Labs   Lab Test 06/16/21  0728   TSH 3.35     No results for input(s): INR in the last 05722 hours.    Christy Lei MD  [facility] Medicine Service  [phone]   [Breast Self Exam] : breast self exam [Nutrition] : nutrition [Exercise] : exercise [Vitamins/Supplements] : vitamins/supplements [Contraception] : contraception

## 2021-10-31 NOTE — PHARMACY-ADMISSION MEDICATION HISTORY
Admission medication history completed at [facility]. Please see Pharmacy - Admission Medication History note from 10/30/2021.

## 2021-10-31 NOTE — H&P
Admission History and Physical   Vira Fallon,  1965, MRN [id number]    [facility]    PCP: Barbie Drew, [phone]          Extended Emergency Contact Information  Primary Emergency Contact: Colton Fallon   [facility]  Home Phone: [phone]  Relation: Father  Secondary Emergency Contact: Jazzmine Mix   [place]  Mobile Phone: [phone]  Relation: Niece       Assessment and Plan       #Gallbladder fossa abscess, post-operative after cholecystectomy for severe cholecystitis and abscess   -Zosyn  -s/p drain though small cavity and no drainage in bulb.  repeat CT 1-2 days per IR.   -surgery consult  -pain control    #asthma - albuterol PRN  #collagenous colitis - budesonide  #hypokalemia - replacement per protocol  #celiac - gluten free diet      Clinically Significant Risk Factors Present on Admission                     Checklist:  Code Status: Full Code    Diet: Combination Diet Regular Diet Adult    Morrow Catheter: Not present  Central Lines: None  DVT px:  Enoxaparin (Lovenox) SQ        Advanced Care Planning  I anticipate the patient will be admitted to the hospital for at least 2 midnights for the evaluation and treatment of the conditions discussed above.     Chief Complaint: Abdominal pain     HPI:    Vira Fallon is a 56 year old female with lap cholecystectomy p/w RUQ pain after cholecytectomy for severe cholecystitis 10/18.  Since surgery has been having severe RUQ abdominal pain and feeling warm (non known fevers).  RULA drain was removed after surgery as was not having any drainage.  Today felt blood and pus come from prior RULA site and came to ER for evaluation.       In the ER:  Found to have a gallbladder fossa ring enhancing fluid collection c/w abscess and IR placed a 10f drain.    History is provided by patient       Physical Exam:  Temp:  [98  F (36.7  C)-98.6  F (37  C)] 98.6  F (37  C)  Pulse:  [] 97  Resp:  [16-19] 18  BP:  "(110-138)/(54-81) 128/60  SpO2:  [95 %-100 %] 98 %  Temp 98.6  F (37  C) (Oral)   Resp 18   Ht 1.537 m (5' 0.5\")   Wt 66.2 kg (146 lb)   SpO2 98%   BMI 28.04 kg/m       General:  Alert, cooperative, no distress,  Appears stated age  Neurologic:  oriented, facialsymmetry preserved, fluent speech. Moves all 4 spontaneously  Psych: calm, mood and affect appropriate to situation  HEENT:  Anicteric, MMM, unremarkable dentition  CV: RRR no MRG, normal S1 and S2, no edema  Lungs: CTAB.  Easyrespirations  Abd: soft, diffuse TTP most noticeable in RUQ,  normoactive BS.  Abscess drain  Skin: no rashes noted on exposed skin. Color and turgor normal  Central Lines and Tubes: abscess drain without drainage in bulm       Pertinent Test Findings  Radiology Results (results reviewed):   No results found for this visit on 10/30/21.        Medical History  Past Medical History:   Diagnosis Date     Celiac disease      Colitis      Uncomplicated asthma         Surgical History  Past Surgical History:   Procedure Laterality Date     COLONOSCOPY       LAPAROSCOPIC CHOLECYSTECTOMY N/A 10/18/2021    Procedure: LAPAROSCOPIC CHOLECYSTECTOMY;  Surgeon: Steve Ba MD;  Location: [facility] Main OR     ORTHOPEDIC SURGERY            Social History  Social History     Tobacco Use     Smoking status: Never Smoker     Smokeless tobacco: Never Used   Vaping Use     Vaping Use: Never used   Substance Use Topics     Alcohol use: Not Currently     Comment: 1 x per year     Drug use: Never          Allergies  Allergies   Allergen Reactions     Animal Dander Itching and Shortness Of Breath     Dust Mites Unknown     Mold Unknown     Perfume Headache and Itching    Family History  Allergies Father       Cancer Mother   smoker  of lung cancer     Relation Name Status Comments   Father         Mother    smoker  of lung cancer               Prior to Admission Medications   Prior to Admission Medications   Prescriptions Last Dose " Informant Patient Reported? Taking?   HYDROcodone-Acetaminophen (VICODIN) 5-300 MG TABS   Yes No   Sig: Take 1 tablet by mouth every 6 hours as needed (for pain)   SUMAtriptan (IMITREX) 50 MG tablet   Yes No   Sig: Take 50 mg by mouth at onset of headache for migraine   acetaminophen (TYLENOL) 500 MG tablet   Yes No   Sig: Take 500-1,000 mg by mouth every 6 hours as needed for mild pain   albuterol (PROAIR HFA/PROVENTIL HFA/VENTOLIN HFA) 108 (90 Base) MCG/ACT inhaler   Yes No   Sig: Inhale 2 puffs into the lungs 4 times daily as needed    amitriptyline (ELAVIL) 25 MG tablet   Yes No   Sig: Take 25 mg by mouth At Bedtime   budesonide (ENTOCORT EC) 3 MG EC capsule   Yes No   Sig: Take 3 mg by mouth every morning    cetirizine (ZYRTEC) 10 MG tablet   Yes No   Sig: Take 10 mg by mouth daily   fluticasone (FLONASE) 50 MCG/ACT nasal spray   Yes No   Sig: Spray 2 sprays into both nostrils daily   melatonin (MELATONIN) 1 MG/ML LIQD liquid   Yes No   Sig: Take 3 mg by mouth nightly as needed for sleep    ondansetron (ZOFRAN-ODT) 8 MG ODT tab   Yes No   Sig: Take 8 mg by mouth every 8 hours as needed for nausea      Facility-Administered Medications: None          Review of Systems:    10point review of systems negative except as listed in HPI      Pertinent Labs  Lab Results: personally reviewed.     Most Recent 3 CBC's:  Recent Labs   Lab Test 10/30/21  1011 10/18/21  1854 06/16/21  0728   WBC 10.0  --  3.9*   HGB 8.3* 9.5* 11.1*   MCV 95  --  93   *  --  211     Most Recent 3 BMP's:  Recent Labs   Lab Test 10/30/21  1011 06/16/21  0728 05/28/21  1441    141 141   POTASSIUM 3.4* 3.9 3.9   CHLORIDE 102 107 104   CO2 24 27 25   BUN 9 15 11   CR 0.76 0.82 0.84   ANIONGAP 12 7 12   ROBERTA 9.1 9.5 9.4   * 93 85     Most Recent 2 LFT's:  Recent Labs   Lab Test 10/30/21  1011 05/28/21  1441   AST 12 16   ALT 10 <9   ALKPHOS 100 102   BILITOTAL 0.5 0.7     Most Recent 3 INR's:No lab results found.  Most Recent 3  Troponin's:No lab results found.    Angel Nielson MD  Internal Medicine Hospitalist  10/30/2021  8:15 PM

## 2021-10-31 NOTE — CONSULTS
Name: Vira Fallon    MRN: [id number]  Consulting Physician:  Fmei Du MD  Date of Admission:  10/30/2021    Reason for Consult   Reason for consult: Abdominal abscess    History of Present Illness   Vira Fallon is a 56 year old female who I was asked to see regarding intra-abdominal abscess.  Patient is a 56-year-old female history of a laparoscopic cholecystectomy about 2 weeks prior.  At that time she had an abscess accompanying acutely inflamed gallbladder.  She had RULA drain left in place which was since been removed.  She developed increasing abdominal pain and presented to the emergency room yesterday.  She began to self decompress through the RULA site at that time and actually feels much better.  She had abdominal CT scan performed which showed intra-abdominal abscess in the gallbladder fossa.  IR placed a RULA drain and received some fluid.  She feels much better today    Assessment and Plan:   Intra-abdominal abscess.  This has been drained by IR and she has been started on IV antibiotics.  We will continue IV antibiotics and plan on repeat imaging next week.    Primary Care Physician   Barbie Drew    Past Medical History    Past Medical History:   Diagnosis Date     Celiac disease      Colitis      Uncomplicated asthma         Past Surgical History   Past Surgical History:   Procedure Laterality Date     COLONOSCOPY       LAPAROSCOPIC CHOLECYSTECTOMY N/A 10/18/2021    Procedure: LAPAROSCOPIC CHOLECYSTECTOMY;  Surgeon: Steve Ba MD;  Location: [facility] Main OR     ORTHOPEDIC SURGERY          Prior to Admission Medications   Prior to Admission Medications   Prescriptions Last Dose Informant Patient Reported? Taking?   HYDROcodone-Acetaminophen (VICODIN) 5-300 MG TABS 10/30/2021 at Unknown time  Yes Yes   Sig: Take 1 tablet by mouth every 6 hours as needed (for pain)   SUMAtriptan (IMITREX) 50 MG tablet Unknown  Yes Yes   Sig: Take 50 mg by mouth at onset of headache for migraine    acetaminophen (TYLENOL) 500 MG tablet 10/30/2021 at Unknown time  Yes Yes   Sig: Take 500-1,000 mg by mouth every 6 hours as needed for mild pain   albuterol (PROAIR HFA/PROVENTIL HFA/VENTOLIN HFA) 108 (90 Base) MCG/ACT inhaler   Yes Yes   Sig: Inhale 2 puffs into the lungs 4 times daily as needed    amitriptyline (ELAVIL) 25 MG tablet 10/29/2021 at Unknown time  Yes Yes   Sig: Take 25 mg by mouth At Bedtime   budesonide (ENTOCORT EC) 3 MG EC capsule 10/29/2021 at Unknown time  Yes Yes   Sig: Take 3 mg by mouth every morning    cetirizine (ZYRTEC) 10 MG tablet 10/29/2021 at pm  Yes Yes   Sig: Take 10 mg by mouth every evening    fluticasone (FLONASE) 50 MCG/ACT nasal spray Unknown  Yes Yes   Sig: Spray 2 sprays into both nostrils daily as needed for other    melatonin (MELATONIN) 1 MG/ML LIQD liquid 10/29/2021  Yes Yes   Sig: Take 3 mg by mouth nightly as needed for sleep    ondansetron (ZOFRAN-ODT) 8 MG ODT tab 10/26/2021  Yes Yes   Sig: Take 8 mg by mouth every 8 hours as needed for nausea      Facility-Administered Medications: None     Allergies   Allergies   Allergen Reactions     Animal Dander Itching and Shortness Of Breath     Dust Mites Unknown     Mold Unknown     Perfume Headache and Itching       Social History   Social History     Tobacco Use     Smoking status: Never Smoker     Smokeless tobacco: Never Used   Vaping Use     Vaping Use: Never used   Substance Use Topics     Alcohol use: Not Currently     Comment: 1 x per year     Drug use: Never        Family History   No family history on file.     Review of Systems   A Comprehensive greater than 10 system review of systems was carried out.  Pertinent positives and negatives are noted above.  Otherwise negative for contributory information.  Please see H    Physical Exam   Temp: 98.1  F (36.7  C) Temp src: Oral BP: 120/56 Pulse: 91   Resp: 16 SpO2: 97 % O2 Device: None (Room air)      GEN:  Alert, oriented x 3, appears comfortable, NAD.  HEENT:   Normocephalic/atraumatic, no scleral icterus, no nasal discharge, mouth moist.  CV:  Regular rate and rhythm, no murmur or JVD.  S1 + S2 noted, no S3 or S4.  LUNGS:  Clear to auscultation bilaterally without rales/rhonchi/wheezing/retractions.  Symmetric chest rise on inhalation noted.  ABD:  Active bowel sounds, soft, non-tender/non-distended.  Incisions healing nicely, percutaneous drain in place in the right upper quadrant  EXT:  No edema.  No cyanosis.  No joint synovitis noted.  SKIN:  Dry to touch, no exanthems noted in the visualized areas.  NEURO: Grossly intact    Data   -Data reviewed today: All pertinent laboratory and imaging results from this encounter were reviewed. I  Recent Labs   Lab 10/31/21  0553 10/30/21  1011   WBC 6.2 10.0   HGB 7.3* 8.3*   HCT 24.2* 26.3*   MCV 97 95    507*     Recent Labs   Lab 10/31/21  0553 10/30/21  2021 10/30/21  1011     --  138   POTASSIUM 3.7 4.0 3.4*   CHLORIDE 105  --  102   CO2 27  --  24   ANIONGAP 7  --  12   GLC 91  --  126*   BUN 7*  --  9   CR 0.75  --  0.76   GFRESTIMATED >90  --  89   ROBERTA 8.7  --  9.1   MAG 1.9 1.9  --    PROTTOTAL  --   --  6.8   ALBUMIN  --   --  2.7*   BILITOTAL  --   --  0.5   ALKPHOS  --   --  100   AST  --   --  12   ALT  --   --  10     No results for input(s): INR in the last 168 hours.    Recent Results (from the past 24 hour(s))   CT Abdomen Pelvis w Contrast    Narrative    EXAM: CT ABDOMEN PELVIS W CONTRAST  LOCATION: [facility]  DATE/TIME: 10/30/2021 10:50 AM    INDICATION: Abdominal pain. Recent abdominal surgery.  COMPARISON: CT abdomen pelvis dated 10/07/2021. Abdominal ultrasound dated 10/07/2021.  TECHNIQUE: CT scan of the abdomen and pelvis was performed following injection of IV contrast. Multiplanar reformats were obtained. Dose reduction techniques were used.  CONTRAST: 100ml Isovue 370     FINDINGS:   LOWER CHEST: Mild subsegmental atelectasis at both lung bases.    HEPATOBILIARY:  Since the prior study the patient has undergone cholecystectomy. In the gallbladder fossa there is a complex collection containing a debris and fluid with an enhancing mass consistent with an abscess. The enhancing fluid extends through   the right lateral abdominal wall at this presumed site of laparoscopic tract. The abscess measures approximately 10 cm in craniocaudal extent by 3.4 cm in maximum left right extent and 4.8 cm in maximum anterior posterior dimension.. Is a small calcific   or mid pelvic density in the collection on axial image 78 of series 3 and coronal image 37. No other focal hepatic lesions.    PANCREAS: Normal.    SPLEEN: Normal.    ADRENAL GLANDS: Normal.    KIDNEYS/BLADDER: Normal.    BOWEL: No bowel obstruction or free intraperitoneal air. There is a small amount of free fluid in the pelvis.    LYMPH NODES: Normal.    VASCULATURE: Unremarkable.    PELVIC ORGANS: Probable small calcified fibroids in the uterus. Small amount of free pelvic fluid no abnormal adnexal mass.    MUSCULOSKELETAL: Mild degenerative change in the hips and spine no acute fracture or suspicious bony lesion.      Impression    IMPRESSION:   1.  Rim-enhancing fluid collection containing some air and debris in the gallbladder fossa extending along the right lateral abdominal wall inferior to the liver to the site of presumed laparoscopic tract through the right lateral abdominal wall the   enhancement and fluid extend through the abdominal wall musculature and subcutaneous fat. Findings are consistent with an abscess in the gallbladder fossa.  2.  Findings were called to Dr. Weston at the time of dictation.    NOTE: ABNORMAL REPORT    THE DICTATION ABOVE DESCRIBES AN ABNORMALITY FOR WHICH FOLLOW-UP IS NEEDED.    US Drainage Seroma/Hematoma Abscess/Cyst    Narrative    [place] [facility]    EXAM: US DRAIN OF SEROMA/HEMATOMA/ABSCESS/CYST ULTRASOUND GUIDED  GALLBLADDER FOSSA DRAIN PLACEMENT    LOCATION: [facility]  [facility]     CLINICAL HISTORY: Intraperitoneal abscess status post lap pam.    PROCEDURES PERFORMED:  1. Placement of a sheath needle into the fluid collection.  2. Advancement of wire through needle, and dilatation tract.  3. Placement of 10 Lao  locking pigtail drain within the collection.    MODERATE SEDATION: Versed and Fentanyl were administered intravenously for moderate sedation. Pulse oximetry, heart rate and blood pressure were continuously monitored by an independent trained observer. The physician spent 15 minutes of face-to-face   moderate sedation time with the patient.    ADDITIONAL MEDICATIONS: see EMR    STERILE BARRIER TECHNIQUE: Maximal Sterile Barrier Technique Utilized: Cap AND mask AND sterile gown AND sterile gloves AND sterile full body drape AND hand hygiene AND skin preparation 2% chlorhexidine for cutaneous antisepsis (or acceptable alternative   antiseptics).   Sterile Ultrasound Technique Utilized ?Sterile gel AND sterile probe covers.    UNIVERSAL PROTOCOL: Standard universal protocol per facility guidelines was followed. See EMR for documentation.     TECHNIQUE:   Risks, benefits and alternatives were explained to the patient and written, informed consent was obtained.  The patient was placed in the supine position on the ultrasound table.  The skin entry site was prepped and draped in the usual, sterile fashion.   One percent lidocaine was utilized for local anesthesia. The collection is easily identified. A sheath needle was advanced into the collection and very little predominantly thick bloody fluid returned. Repositioning the needle several times revealed no   significant fluid returned. A wire was advanced and a 10 Lao catheter advanced over the wire. Again, there was very little fluid returned. The pigtail loop was engaged.  The catheter was aspirated and flushed, and placed to a bulb syringe. The   catheter was secured to the skin with an adhesive anchoring  device.   The patient tolerated the procedure well,  without immediate complication.    FINDINGS:  The preliminary ultrasound images show a fluid collection in the subhepatic region.  The findings correspond to the abnormalities seen on the most recent CT. Images obtained during the procedure demonstrate the needle and catheter within the collection.    Ultrasound images have been archived permanently for documentation.  The catheter does not aspirate very much fluid and the collection has decreased since the CT which may be related to drainage.  7 cc of bloody thick fluid were removed.  The fluid was sent for the requested lab tests.       Impression    IMPRESSION:  1.  Ultrasound guided placement of a 10 [place]ib[place]ian subhepatic drainage catheter.    2.  Catheter output should be monitored closely, and repeat imaging should be considered once output has  to less than 20 ml/day.    3.  Given the lack of output and the small character of the collection, if there is no output in the next 24-48 hours, low threshold for repeating a CT scan to assess catheter position and collection size.    4.  It should be noted on CT that the high attenuation area within the collection is likely a dropped gallstone which may make sterilization of this collection much more difficult.                 Steve Du MD

## 2021-10-31 NOTE — PROGRESS NOTES
OCCUPATIONAL THERAPY:  OT evaluation deferred. Spoke with patient. No OT needs at this time. Will d/c orders. Thank you.  Mary Reyes, OTR/L  10/31/2021

## 2021-10-31 NOTE — PLAN OF CARE
Physical Therapy: Orders received. Chart reviewed and discussed with care team.? Physical Therapy not indicated due to patient reports being at baseline for functional mobility and declined evaluation.? Educated patient to communicate with RN if she feels PT eval is needed during hospital stay for new orders.? Will complete orders.

## 2021-10-31 NOTE — PROVIDER NOTIFICATION
Per Dr Du ...States that it would not be unusual to have a stone in the fossa. No surgery at this time. The goal is to treat the abscess and repeat the CT scan... Conversation related to Dr Markus Mccord

## 2021-10-31 NOTE — PLAN OF CARE
Problem: Adult Inpatient Plan of Care  Goal: Plan of Care Review  Outcome: Change based on patient need/priority   Plan of care, meds and treatments explained. Patient verbalizes understanding of the plan of care.  Problem: Pain Acute  Goal: Acceptable Pain Control and Functional Ability  Outcome: Change based on patient need/priority  Intervention: Develop Pain Management Plan  Recent Flowsheet Documentation  Taken 10/31/2021 0832 by Delfina Dimas RN  Pain Management Interventions:   medication (see MAR)   emotional support   repositioned   Pain controlled with scheduled and prn pain meds. Tolerating oral intake and activity. Continue to assess and treat prn. Cluster cares to promote a healing environment. Emotional support

## 2021-10-31 NOTE — PLAN OF CARE
Problem: Adult Inpatient Plan of Care  Goal: Plan of Care Review  Outcome: Improving  Goal: Patient-Specific Goal (Individualized)  Outcome: Improving  Goal: Absence of Hospital-Acquired Illness or Injury  Outcome: Improving  Intervention: Identify and Manage Fall Risk  Recent Flowsheet Documentation  Taken 10/31/2021 0026 by Patricia Vigil, RN  Safety Promotion/Fall Prevention: lighting adjusted  Intervention: Prevent Skin Injury  Recent Flowsheet Documentation  Taken 10/31/2021 0026 by Patricia Vigil, RN  Body Position: position changed independently  Goal: Optimal Comfort and Wellbeing  Outcome: Improving  Goal: Readiness for Transition of Care  Outcome: Improving     Problem: Pain Acute  Goal: Acceptable Pain Control and Functional Ability  Outcome: Improving  Intervention: Develop Pain Management Plan  Recent Flowsheet Documentation  Taken 10/31/2021 0411 by Patricia Vigil, RN  Pain Management Interventions:   medication (see MAR)   emotional support   repositioned  Taken 10/31/2021 0026 by Patricia Vigil, RN  Pain Management Interventions:   medication (see MAR)   repositioned     Problem: Infection  Goal: Absence of Infection Signs and Symptoms  Outcome: Improving   Pt a/o with pain improved with prn dilaudid and oxycodone. RULA draining bloody drainage 30 ml. Iv antibiotics given as ordered. Will continue to monitor .

## 2021-10-31 NOTE — PLAN OF CARE
Problem: Pain Acute  Goal: Acceptable Pain Control and Functional Ability  Outcome: Improving  Intervention: Develop Pain Management Plan  Recent Flowsheet Documentation  Taken 10/30/2021 2100 by Arnaud Montelongo RN  Pain Management Interventions: medication (see MAR)   C/o abdominal surgical site pain PRN oxycodone was given with effective result.  Problem: Infection  Goal: Absence of Infection Signs and Symptoms  Outcome: Improving   No s/s of infection noted.

## 2021-11-01 ENCOUNTER — APPOINTMENT (OUTPATIENT)
Dept: CT IMAGING | Facility: HOSPITAL | Age: 56
DRG: 863 | End: 2021-11-01
Attending: SPECIALIST
Payer: COMMERCIAL

## 2021-11-01 VITALS
HEIGHT: 61 IN | DIASTOLIC BLOOD PRESSURE: 56 MMHG | RESPIRATION RATE: 18 BRPM | OXYGEN SATURATION: 99 % | HEART RATE: 85 BPM | WEIGHT: 146 LBS | SYSTOLIC BLOOD PRESSURE: 107 MMHG | TEMPERATURE: 98.1 F | BODY MASS INDEX: 27.56 KG/M2

## 2021-11-01 LAB
ALBUMIN SERPL-MCNC: 2.3 G/DL (ref 3.5–5)
ALP SERPL-CCNC: 108 U/L (ref 45–120)
ALT SERPL W P-5'-P-CCNC: 9 U/L (ref 0–45)
ANION GAP SERPL CALCULATED.3IONS-SCNC: 5 MMOL/L (ref 5–18)
AST SERPL W P-5'-P-CCNC: 11 U/L (ref 0–40)
BILIRUB SERPL-MCNC: 0.4 MG/DL (ref 0–1)
BUN SERPL-MCNC: 7 MG/DL (ref 8–22)
CALCIUM SERPL-MCNC: 9.1 MG/DL (ref 8.5–10.5)
CHLORIDE BLD-SCNC: 108 MMOL/L (ref 98–107)
CO2 SERPL-SCNC: 28 MMOL/L (ref 22–31)
CREAT SERPL-MCNC: 0.78 MG/DL (ref 0.6–1.1)
ERYTHROCYTE [DISTWIDTH] IN BLOOD BY AUTOMATED COUNT: 12.9 % (ref 10–15)
GFR SERPL CREATININE-BSD FRML MDRD: 86 ML/MIN/1.73M2
GLUCOSE BLD-MCNC: 81 MG/DL (ref 70–125)
HCT VFR BLD AUTO: 24.2 % (ref 35–47)
HGB BLD-MCNC: 7.3 G/DL (ref 11.7–15.7)
MAGNESIUM SERPL-MCNC: 2.2 MG/DL (ref 1.8–2.6)
MCH RBC QN AUTO: 29.4 PG (ref 26.5–33)
MCHC RBC AUTO-ENTMCNC: 30.2 G/DL (ref 31.5–36.5)
MCV RBC AUTO: 98 FL (ref 78–100)
PLATELET # BLD AUTO: 398 10E3/UL (ref 150–450)
POTASSIUM BLD-SCNC: 4.2 MMOL/L (ref 3.5–5)
PROT SERPL-MCNC: 5.8 G/DL (ref 6–8)
RBC # BLD AUTO: 2.48 10E6/UL (ref 3.8–5.2)
SODIUM SERPL-SCNC: 141 MMOL/L (ref 136–145)
WBC # BLD AUTO: 4.3 10E3/UL (ref 4–11)

## 2021-11-01 PROCEDURE — 250N000013 HC RX MED GY IP 250 OP 250 PS 637: Performed by: SPECIALIST

## 2021-11-01 PROCEDURE — 99239 HOSP IP/OBS DSCHRG MGMT >30: CPT | Performed by: HOSPITALIST

## 2021-11-01 PROCEDURE — 99207 PR NO CHARGE LOS: CPT | Performed by: INTERNAL MEDICINE

## 2021-11-01 PROCEDURE — G0008 ADMIN INFLUENZA VIRUS VAC: HCPCS | Performed by: EMERGENCY MEDICINE

## 2021-11-01 PROCEDURE — 82040 ASSAY OF SERUM ALBUMIN: CPT | Performed by: INTERNAL MEDICINE

## 2021-11-01 PROCEDURE — 74150 CT ABDOMEN W/O CONTRAST: CPT

## 2021-11-01 PROCEDURE — 36415 COLL VENOUS BLD VENIPUNCTURE: CPT | Performed by: HOSPITALIST

## 2021-11-01 PROCEDURE — 90682 RIV4 VACC RECOMBINANT DNA IM: CPT | Performed by: EMERGENCY MEDICINE

## 2021-11-01 PROCEDURE — 250N000013 HC RX MED GY IP 250 OP 250 PS 637: Performed by: HOSPITALIST

## 2021-11-01 PROCEDURE — 250N000011 HC RX IP 250 OP 636: Performed by: EMERGENCY MEDICINE

## 2021-11-01 PROCEDURE — 83735 ASSAY OF MAGNESIUM: CPT | Performed by: INTERNAL MEDICINE

## 2021-11-01 PROCEDURE — 85027 COMPLETE CBC AUTOMATED: CPT | Performed by: HOSPITALIST

## 2021-11-01 PROCEDURE — 250N000011 HC RX IP 250 OP 636: Performed by: HOSPITALIST

## 2021-11-01 RX ADMIN — BUDESONIDE 3 MG: 3 CAPSULE ORAL at 09:35

## 2021-11-01 RX ADMIN — AMOXICILLIN AND CLAVULANATE POTASSIUM 1 TABLET: 875; 125 TABLET, FILM COATED ORAL at 14:09

## 2021-11-01 RX ADMIN — INFLUENZA A VIRUS A/WISCONSIN/588/2019 (H1N1) RECOMBINANT HEMAGGLUTININ ANTIGEN, INFLUENZA A VIRUS A/TASMANIA/503/2020 (H3N2) RECOMBINANT HEMAGGLUTININ ANTIGEN, INFLUENZA B VIRUS B/WASHINGTON/02/2019 RECOMBINANT HEMAGGLUTININ ANTIGEN, AND INFLUENZA B VIRUS B/PHUKET/3073/2013 RECOMBINANT HEMAGGLUTININ ANTIGEN 0.5 ML: 45; 45; 45; 45 INJECTION INTRAMUSCULAR at 09:42

## 2021-11-01 RX ADMIN — PIPERACILLIN AND TAZOBACTAM 3.38 G: 3; .375 INJECTION, POWDER, LYOPHILIZED, FOR SOLUTION INTRAVENOUS at 04:02

## 2021-11-01 RX ADMIN — ENOXAPARIN SODIUM 40 MG: 40 INJECTION SUBCUTANEOUS at 09:42

## 2021-11-01 RX ADMIN — ACETAMINOPHEN 650 MG: 325 TABLET ORAL at 12:26

## 2021-11-01 ASSESSMENT — ACTIVITIES OF DAILY LIVING (ADL)
ADLS_ACUITY_SCORE: 3

## 2021-11-01 NOTE — PLAN OF CARE
Problem: Pain Acute  Goal: Acceptable Pain Control and Functional Ability  Outcome: No Change  Intervention: Develop Pain Management Plan  Recent Flowsheet Documentation  Taken 11/1/2021 0000 by Mere Whitt RN  Pain Management Interventions: declines   Patient reports minimal pain and denies the need for pain medications. IV antibiotics as ordered. VSS

## 2021-11-01 NOTE — PLAN OF CARE
10/28/21                            Vj Morley  [address]  [place] [address]    To Whom It May Concern:    This is to certify Vj Morley was evaluated with Jenn Rosales CNP on 10/28/21 and can return to school on 10/29/21.     RESTRICTIONS: none              Jenn Rosales CNP  [address] [address]  Phone: [phone]       Problem: Mobility Impairment  Goal: Optimal Mobility  Outcome: Improving  Intervention: Optimize Mobility  Recent Flowsheet Documentation  Taken 10/31/2021 2030 by Kajal Moore, RN  Activity Management: ambulated in espinal  Taken 10/31/2021 1600 by Kajal Moore, RN  Activity Management: ambulated in espinal     Problem: Pain Acute  Goal: Acceptable Pain Control and Functional Ability  Outcome: Improving     Ambulated in hallway with staff.  TAMAR drain in place, 5cc of dark, older blood that has some purulent drainage with it.  Stripped the tamar tubing and had a little more drainage.  Dressing changed to drain site, second puncture site just below and medial drained a small amount of thin serosanginous  drainage.      Did have some stomach discomfort, took maalox, helpful.    Kajal Moore RN

## 2021-11-01 NOTE — PROGRESS NOTES
"Vira Fallon is a 56 year old female patient.  No diagnosis found.  Past Medical History:   Diagnosis Date     Celiac disease      Colitis      Uncomplicated asthma      No current outpatient medications on file.     Allergies   Allergen Reactions     Animal Dander Itching and Shortness Of Breath     Dust Mites Unknown     Mold Unknown     Perfume Headache and Itching     Principal Problem:    Postoperative intra-abdominal abscess  Active Problems:    Celiac disease    Extrinsic asthma    Collagenous Colitis    Blood pressure 118/55, pulse 84, temperature 98.4  F (36.9  C), temperature source Oral, resp. rate 18, height 1.537 m (5' 0.5\"), weight 66.2 kg (146 lb), SpO2 99 %.    Subjective comfortable, pain much improved, bowel function this a.m.  Objective abdomen soft, subcostal drain minimum old bloody fluid  Assessment & Plan status post abscess drainage.  Minimal output WBC normal.  Will get follow-up CT scan today if okay will discharge to home with drain on 10 days of oral Augmentin.    Steve Ba MD, MD  11/1/2021    "

## 2021-11-01 NOTE — PROGRESS NOTES
"  Interventional Radiology - Progress Note  Inpatient - [facility]: Interventional Radiology   [phone]  11/1/2021    S:  Has mild increasing pain at drain site.     Culture:  + Lactose fermenting gram negative bacilli  Antibiotic: Augmentin/Zosyn  Flushing:  No flushes ordered as of 11/1. New flushing orders for 10 mL NS every shift placed.     O:  /55 (BP Location: Left arm)   Pulse 84   Temp 98.4  F (36.9  C) (Oral)   Resp 18   Ht 1.537 m (5' 0.5\")   Wt 66.2 kg (146 lb)   SpO2 99%   BMI 28.04 kg/m    General:  Stable.  In no acute distress.    Neuro:  A&O x 3.   Resp:  Unlabored breathing  Abdomen:  RUQ drain exit site without erythema or drainage to bulb suction, scant thick reddish brown fluid in bulb. Flushed with 10 mL NS at bedside.     IMAGING:  [facility]     EXAM: US DRAIN OF SEROMA/HEMATOMA/ABSCESS/CYST ULTRASOUND GUIDED  GALLBLADDER FOSSA DRAIN PLACEMENT     LOCATION: [facility]      CLINICAL HISTORY: Intraperitoneal abscess status post lap pam.     PROCEDURES PERFORMED:  1. Placement of a sheath needle into the fluid collection.  2. Advancement of wire through needle, and dilatation tract.  3. Placement of 10 [place] citizen  locking pigtail drain within the collection.     MODERATE SEDATION: Versed and Fentanyl were administered intravenously for moderate sedation. Pulse oximetry, heart rate and blood pressure were continuously monitored by an independent trained observer. The physician spent 15 minutes of face-to-face   moderate sedation time with the patient.     ADDITIONAL MEDICATIONS: see EMR     STERILE BARRIER TECHNIQUE: Maximal Sterile Barrier Technique Utilized: Cap AND mask AND sterile gown AND sterile gloves AND sterile full body drape AND hand hygiene AND skin preparation 2% chlorhexidine for cutaneous antisepsis (or acceptable alternative   antiseptics).   Sterile Ultrasound Technique Utilized ?Sterile gel AND sterile probe " covers.     UNIVERSAL PROTOCOL: Standard universal protocol per facility guidelines was followed. See EMR for documentation.      TECHNIQUE:   Risks, benefits and alternatives were explained to the patient and written, informed consent was obtained.  The patient was placed in the supine position on the ultrasound table.  The skin entry site was prepped and draped in the usual, sterile fashion.   One percent lidocaine was utilized for local anesthesia. The collection is easily identified. A sheath needle was advanced into the collection and very little predominantly thick bloody fluid returned. Repositioning the needle several times revealed no   significant fluid returned. A wire was advanced and a 10 Swazi catheter advanced over the wire. Again, there was very little fluid returned. The pigtail loop was engaged.  The catheter was aspirated and flushed, and placed to a bulb syringe. The   catheter was secured to the skin with an adhesive anchoring device.   The patient tolerated the procedure well,  without immediate complication.     FINDINGS:  The preliminary ultrasound images show a fluid collection in the subhepatic region.  The findings correspond to the abnormalities seen on the most recent CT. Images obtained during the procedure demonstrate the needle and catheter within the collection.    Ultrasound images have been archived permanently for documentation.  The catheter does not aspirate very much fluid and the collection has decreased since the CT which may be related to drainage.  7 cc of bloody thick fluid were removed.  The fluid was sent for the requested lab tests.                                                                       IMPRESSION:  1.  Ultrasound guided placement of a 10 Swazi subhepatic drainage catheter.     2.  Catheter output should be monitored closely, and repeat imaging should be considered once output has  to less than 20 ml/day.     3.  Given the lack of output and the  small character of the collection, if there is no output in the next 24-48 hours, low threshold for repeating a CT scan to assess catheter position and collection size.     4.  It should be noted on CT that the high attenuation area within the collection is likely a dropped gallstone which may make sterilization of this collection much more difficult.       LABS:  CBC RESULTS: Recent Labs   Lab Test 11/01/21  0536   WBC 4.3   RBC 2.48*   HGB 7.3*   HCT 24.2*   MCV 98   MCH 29.4   MCHC 30.2*   RDW 12.9        Drain Outputs (in mL):  11/1 10   10/1 50   10/30 0       A:  56 year old female s/p recent lap pam with post op gallbladder fossa abscess s/p US guided 10 Fr drain placement 10/30/21    P:    - Follow up CT today shows drain in good position.  - Staff to begin flushing drain with 10 mL NS every shift.  - Continue present drain cares. Continue drain to RULA drainage.  - Drain care education provided to patient. All questions answered.   - Drain discharge instructions entered into D/C navigator.  - Patient to flush drain with 10mL NS daily after discharge. NS flushes ordered in D/C navigator.  - Recommend follow up CT and abscessogram approximately 7-10 days from drain placement date. Perhaps sooner if there are changes in drain function or in patient's clinical course. Discussed plan for follow up with patient/family who expressed their understanding.   - IR will continue to follow loosely. Please contact IR with drain related questions or concerns.    Total time spent on the date of the encounter is 15 minutes, including time spent counseling the patient, performing a medically appropriate evaluation, reviewing prior medical history, ordering medications and tests, documenting clinical information in the medical record, and communication of results.    Molly Cuevas, CNP  Interventional Radiology  [phone]    E/M codes for reference only:  71760

## 2021-11-01 NOTE — PLAN OF CARE
Pt verbalized understanding of discharge instructions to RN. Pt wheelchair to hospital door with staff.

## 2021-11-01 NOTE — PLAN OF CARE
Problem: Infection  Goal: Absence of Infection Signs and Symptoms  Outcome: Improving   Afebrile. Switched to po antibiotic's.  Problem: Pain Acute  Goal: Acceptable Pain Control and Functional Ability  Outcome: Improving   Denies pain.

## 2021-11-01 NOTE — DISCHARGE SUMMARY
[facility] MEDICINE  DISCHARGE SUMMARY     Primary Care Physician: Barbie Drew  Admission Date: 10/30/2021   Discharge Provider: Angel Nielson MD Discharge Date: 11/1/2021   Diet:   Active Diet and Nourishment Order   Procedures     Gluten Free Diet     Diet       Code Status: Full Code   Activity: DCACTIVITY: Activity as tolerated        Condition at Discharge: Stable     REASON FOR PRESENTATION(See Admission Note for Details)     Abdominal pain    PRINCIPAL & ACTIVE DISCHARGE DIAGNOSES     Principal Problem:    Postoperative intra-abdominal abscess  Active Problems:    Celiac disease    Extrinsic asthma    Collagenous Colitis      PENDING LABS     Unresulted Labs Ordered in the Past 30 Days of this Admission     Date and Time Order Name Status Description    10/30/2021  5:26 PM Anaerobic culture Preliminary     10/30/2021  5:26 PM Abscess Aerobic Bacterial Culture Routine with Gram Stain Preliminary             PROCEDURES ( this hospitalization only)          RECOMMENDATIONS TO OUTPATIENT PROVIDER FOR F/U VISIT     Follow-up Appointments     Follow-up and recommended labs and tests      Follow up with primary care provider, Barbie Drew and Dr. Salinas   within 7 days for hospital follow- up.  The following labs/tests are   recommended: CT scan with IR in 7-10 days.               DISPOSITION     Home    SUMMARY OF HOSPITAL COURSE:      56F with recent cholecytectomy for severe cholecystitis 10/18 who re-presents with persistent severe abdominal pain and is found to have an abscess in the gallbladder fossa.  Underwent IR drain placement and started on zosyn.  US had commented on possible dropped gallstone, surgery is aware and think this is surgicel and does not require further intervention at this time.  Cultures growing LF-GNB.  Repeat CT scan with interval reduction in abscess size.  Ms. Fallon is feeling better and will be discharged to home with Augmentin x 10 day and  plan to repeat CT scan in 7-10 with IR.       and   Since surgery has been having severe RUQ abdominal pain and feeling warm (non known fevers).  RULA drain was removed after surgery as was not having any drainage.  Today felt blood and pus come from prior RULA site and came to ER for evaluation.       Addendum:  After discharge cultures returned with enterobacter resistant to amp/sulbactam.  Dr. Saunders added Levaquin (appreciate follow-up), and I have added flagyl for intra-abdominal abscess.  Advised to monitor Hb (acute on chronic anemia, likely due to a combination of blood loss and chronic disease) with PCP and possible need for iron replacement.  D/w MS. Fallon by phone 11/2 @ 12:35  Angel Nielson MD  Internal Medicine Hospitalist  11/2/2021          Discharge Medications with Med changes:     Current Discharge Medication List      START taking these medications    Details   amoxicillin-clavulanate (AUGMENTIN) 875-125 MG tablet Take 1 tablet by mouth every 12 hours  Qty: 20 tablet, Refills: 0    Associated Diagnoses: Postoperative intra-abdominal abscess      sodium chloride, PF, 0.9% PF flush Irrigate with 10 mLs as directed daily  Qty: 30 mL, Refills: 1    Associated Diagnoses: Peritoneal abscess (H)         CONTINUE these medications which have NOT CHANGED    Details   acetaminophen (TYLENOL) 500 MG tablet Take 500-1,000 mg by mouth every 6 hours as needed for mild pain      albuterol (PROAIR HFA/PROVENTIL HFA/VENTOLIN HFA) 108 (90 Base) MCG/ACT inhaler Inhale 2 puffs into the lungs 4 times daily as needed       amitriptyline (ELAVIL) 25 MG tablet Take 25 mg by mouth At Bedtime      budesonide (ENTOCORT EC) 3 MG EC capsule Take 3 mg by mouth every morning       cetirizine (ZYRTEC) 10 MG tablet Take 10 mg by mouth every evening       fluticasone (FLONASE) 50 MCG/ACT nasal spray Spray 2 sprays into both nostrils daily as needed for other       HYDROcodone-Acetaminophen (VICODIN) 5-300 MG TABS Take 1 tablet by  mouth every 6 hours as needed (for pain)      melatonin (MELATONIN) 1 MG/ML LIQD liquid Take 3 mg by mouth nightly as needed for sleep       ondansetron (ZOFRAN-ODT) 8 MG ODT tab Take 8 mg by mouth every 8 hours as needed for nausea      SUMAtriptan (IMITREX) 50 MG tablet Take 50 mg by mouth at onset of headache for migraine                   Rationale for medication changes:              Consults       CARE MANAGEMENT / SOCIAL WORK IP CONSULT  PHYSICAL THERAPY ADULT IP CONSULT  OCCUPATIONAL THERAPY ADULT IP CONSULT  SURGERY GENERAL IP CONSULT  ADVANCE DIRECTIVE IP CONSULT    Immunizations given this encounter     Most Recent Immunizations   Administered Date(s) Administered     COVID-19,PF,Pfizer (12+ Yrs) 04/29/2021     Influenza Quad, Recombinant, pf(RIV4) (Flublok) 11/01/2021           Anticoagulation Information            SIGNIFICANT IMAGING FINDINGS     Results for orders placed or performed during the hospital encounter of 10/30/21   CT Abdomen w/o Contrast    Impression    IMPRESSION:   1.  Status post percutaneous drain placement into the gallbladder fossa abscess which is mild to moderately decreased in size since the CT from 10/30/2021. The pigtail loop of the catheter is centered within the abscess.       SIGNIFICANT LABORATORY FINDINGS     Most Recent 3 CBC's:Recent Labs   Lab Test 11/01/21  0536 10/31/21  0553 10/30/21  1011   WBC 4.3 6.2 10.0   HGB 7.3* 7.3* 8.3*   MCV 98 97 95    390 507*     Most Recent 3 BMP's:Recent Labs   Lab Test 11/01/21  0536 10/31/21  0553 10/30/21  2021 10/30/21  1011 10/30/21  1011    139  --   --  138   POTASSIUM 4.2 3.7 4.0   < > 3.4*   CHLORIDE 108* 105  --   --  102   CO2 28 27  --   --  24   BUN 7* 7*  --   --  9   CR 0.78 0.75  --   --  0.76   ANIONGAP 5 7  --   --  12   ROBERTA 9.1 8.7  --   --  9.1   GLC 81 91  --   --  126*    < > = values in this interval not displayed.     Most Recent 2 LFT's:Recent Labs   Lab Test 11/01/21  0536 10/30/21  1011   AST 11  12   ALT 9 10   ALKPHOS 108 100   BILITOTAL 0.4 0.5           Discharge Orders        When to call - Contact Surgeon Team    You may experience symptoms that require follow-up before your scheduled appointment. Contact your Surgeon Team if you are concerned about pain control, large amount of bleeding, blood clots, constipation, or if you experience signs of infection (fever, growing tenderness at the surgery site, a large amount of drainage, severe pain, foul-smelling drainage, redness or swelling.     When to call - Reach out to Urgent Care    If you are experiencing uncontrolled Nausea and Vomiting, uncontrolled pain, inability to urinate and uncomfortable, and in need of immediate care, and you are NOT able to reach your Surgeon Team, go to an Urgent Care clinic. Do NOT go to the Emergency Room unless you have shortness of breath, chest pain, or other signs of a medical emergency.     When to call - Reasons to Call 911    Call 911 immediately if you experience sudden-onset chest pain, arm weakness/numbness, slurred speech, or shortness of breath     Symptoms - Fever Management    A low grade fever can be expected after surgery. Your Provider many have prescribed an Opioid pain medication that also contains acetaminophen (TYLENOL) that may help with Fever management.  Do NOT take additional acetaminophen (TYLENOL) in combination with an Opioid/acetaminophen (TYLENOL) product. Read the labels on your Over The Counter (OTC) medications with care.     Symptoms - Reduced Urine Output    If it has been greater than 8 hours since you have urinated despite drinking plenty of water, call your Surgeon Team.     No driving or operating machinery    Do NOT drive any vehicle or operate mechanical equipment for 24 hours following the end of your surgery.  Even though you may feel normal, your reactions may be affected by Anesthesia medication you received.     No Alcohol    Do NOT drink alcoholic beverages for 24 hours  following your surgery and while taking pain medications.     Diet Instructions    Follow your surgeon's orders for any diet restrictions.  If you did not receive any diet restrictions, you may drink clear liquids (apple juice, ginger ale, 7-up, broth, etc.), and progress to your regular diet as you feel able. It is important to stay well-hydrated after surgery and drink plenty of water.     Dressing / Wound Care - Wound    You have a clean dressing on your surgical wound. Dressing change instructions as follows: remove dressing day after surgery, apply dry gauze  as needed   Contact your Surgeon Team if you have increased redness, warmth around the surgical wound, and/or drainage from the surgical wound.     Discharge Instructions - Comfort and Pain Management    Pain after surgery is normal and expected. You will have some amount of pain after surgery. Your pain will improve with time. There are several things you can do to help reduce your pain including: rest, ice, and using pain medications as needed. Use pain interventions and don't wait until pain level is out of control. Contact your Surgeon Team if you have pain that persists or worsens after surgery despite rest, ice, and taking your medication(s) as prescribed. You may have a dry mouth, a sore throat, muscles aches or trouble sleeping, and these symptoms should go away after 24 hours.     Discharge Instructions - Rest    Rest and relax for the next 24 hours. Make arrangements to have someone stay with you overnight, and avoid hazardous and strenuous activities.  Do NOT make any important decisions for the next 24 hours.     Return to normal activity as tolerated    Return to normal activity as tolerated     Shower/Bathing - No restrictions, may shower after 24 hours    Shower/Bathing - No restrictions, may shower after 24 hours.     Reason for your hospital stay    You were admitted for an intra-abdominal abscess in the gallbladder fossa     Follow-up and  recommended labs and tests    Follow up with primary care provider, Barbie Drew and Dr. Salinas within 7 days for hospital follow- up.  The following labs/tests are recommended: CT scan with IR in 7-10 days.     Activity    Your activity upon discharge: activity as tolerated     When to contact your care team    Call your primary doctor if you have any of the following: temperature greater than 100.5,  chest pain, shortness of breath, lethargy, change in mental status, increased drainage or increased swelling, any new and concerning symptoms.     Diet    Follow this diet upon discharge: Orders Placed This Encounter      Gluten Free Diet         Examination   Physical Exam   Temp:  [97.9  F (36.6  C)-98.4  F (36.9  C)] 98.1  F (36.7  C)  Pulse:  [81-93] 85  Resp:  [16-18] 18  BP: (100-123)/(52-57) 107/56  SpO2:  [94 %-99 %] 99 %  Wt Readings from Last 1 Encounters:   10/30/21 66.2 kg (146 lb)       Feeling better.  Abdominal pain improved.  Bloody cloudy fluid from drain      Please see EMR for more detailed significant labs, imaging, consultant notes etc.    IAngel MD, personally saw the patient today and spent greater than 30 minutes discharging this patient.    Angel Nielson MD  [facility]    CC:Barbie Drew

## 2021-11-01 NOTE — DISCHARGE INSTRUCTIONS
Drain Placement Discharge Instructions:  You had a small tube or drain(s) placed. This was placed so the abnormal fluid collection within your body can be drained out (externally). Please follow the below instructions as you recover:    Care Instructions after drain placement:  - If you received sedation for your procedure, do not drive or operate heavy machinery for the rest of the day.  - Rest after your drain(s) were placed. Avoid strenuous activity and heavy lifting for the next 2 days. Return to your normal activities as you tolerate after the 2 day restriction.  - You may shower; however, you should not submerge site under water like in a tub bath, Jacuzzi or pool. Cover drain(s) exit site(s) with plastic wrap while showering.  - Keep dressing clean and dry as long as drain (s) are in place. If you have a gauze dressing, please change the dressing as needed to keep site(s) clean and dry.  - You may eat and drink as normal.  - You may have discomfort after the procedure near the drain exit site(s). You may take acetaminophen (Tylenol ) or ibuprofen (Motrin ) as needed for any discomfort.  - Inspect the tube(s) often for kinks.  - If you have a gravity bag, keep the bag below the drain exit site(s) to allow for free flow of drainage by gravity.  - Flush your drainage tube with 10mL sterile normal saline daily.  - Record your daily drain(s) outputs and amount flushed on your drainage record. Bring your records to your next radiology appointment.  - Empty your drainage bag(s)/bulb(s) daily or when it is approximately half way fluid. Follow below instructions for emptying your bag(s)/bulb(s):  - Clean hands well with soap and water.  - Place a measuring container near the outlet valve of the drainage bag/bulb.  - If you have a drainage BAG: Twist the blue valve at the bottom of the bag while holding the valve over your measuring cup and open container to empty. Re-twist the valve closed on the drainage bag once  "complete.  - If you have a drainage BULB: Open the bulb cap (at the top of the bulb). Empty the fluid into the measuring cup. Squeeze bulb and hold flat. While bulb is squeezed, close the cap.  - After draining fluid record your drainage output. Bring record of your drain outputs to your next radiology appointment.  - Discard drainage into toilet once fluid drained.    Flushing Your Drainage Tube:   1. Collect flushing supplies: 10mL of sterile normal saline in syringe, alcohol pad.  2. Clean the flushing (center) port with alcohol and attach the flushing syringe by twisting into place.       3. Turn the 3 way stopcock valve \"off\" to the drainage bag/bulb.    4. Gently inject/flush the drain so fluid is moving towards your body through the drainage catheter.   5. Turn the stopcock valve back to its center position to allow for drainage to resume.           6. Remove flushing syringe from flushing port by twisting off.?    Follow-Up:   - [facility] will call you to schedule your next follow-up appointment. Please call [facility] if you are not contacted within the next week at [phone]     Please seek medical evaluation for:  - Nausea and/or vomiting.  - Diffuse abdominal pain.  - Fevers (greater than 101 F (38.3C)).    Call [facility] at [phone] with tube related questions or concerns:  - Drainage tube(s) falls out, is pulled back or felt to be out of position.   - Unable to flush drainage tube(s).   - Leakage (or purulent drainage) around drainage tube site(s).   - Extreme pain at drainage tube site(s).   - Outputs suddenly stop or significantly reduces.   - Warmth, redness, swelling or tenderness around the drainage tube(s).    "

## 2021-11-02 ENCOUNTER — PATIENT OUTREACH (OUTPATIENT)
Dept: CARE COORDINATION | Facility: CLINIC | Age: 56
End: 2021-11-02

## 2021-11-02 DIAGNOSIS — Z71.89 OTHER SPECIFIED COUNSELING: ICD-10-CM

## 2021-11-02 RX ORDER — LEVOFLOXACIN 750 MG/1
750 TABLET, FILM COATED ORAL DAILY
Qty: 10 TABLET | Refills: 0 | Status: SHIPPED | OUTPATIENT
Start: 2021-11-02 | End: 2021-11-12

## 2021-11-02 RX ORDER — METRONIDAZOLE 500 MG/1
500 TABLET ORAL 2 TIMES DAILY
Qty: 20 TABLET | Refills: 0 | Status: SHIPPED | OUTPATIENT
Start: 2021-11-02 | End: 2024-06-10

## 2021-11-02 NOTE — PROGRESS NOTES
Enterobacter cloacae complex     LAINE (Preliminary)     Ampicillin  Resistant 1     Ampicillin/ Sulbactam  Resistant 1     Cefepime <=1.0 ug/mL Susceptible     Ceftazidime <=1.0 ug/mL Susceptible     Ceftriaxone <=1.0 ug/mL Susceptible     Ciprofloxacin <=0.25 ug/mL Susceptible     Gentamicin <=1.0 ug/mL Susceptible     Levofloxacin <=0.12 ug/mL Susceptible     Meropenem <=0.25 ug/mL Susceptible     Piperacillin/Tazobactam <=4.0 ug/mL Susceptible     Tobramycin <=1.0 ug/mL Susceptible     Trimethoprim/Sulfamethoxazole <=1/19 ug/mL Susceptible      Final abscess culture was brought to my attention on this patient.  Patient was discharged yesterday by Dr. Nielson, who is of service today. Prescription for Augmentin was given at discharge.    Per sensitivity results, recommending changing Augmentin to levofloxacin.  I called patient to inform care on antibiotics which indications.    Discussed with patient there is no known medication allergies.  She requested new prescription to be sent to [facility] in [place].  10 days of levofloxacin 750 mg prescription sent to requested pharmacy.    Ashtyn Saunders MD

## 2021-11-02 NOTE — PROGRESS NOTES
Clinic Care Coordination Contact  Care Team Conversations    Patient identified for care management outreach, however patient is not on a value based contract so cannot complete outreach. Will escalate to clinic staff if specific needs or resources are indicated.      Alayna Silveira \[facility] [place] [facility]\""  Clinic Care Coordinator  [facility]   [phone]

## 2021-11-03 LAB
BACTERIA ABSC ANAEROBE+AEROBE CULT: ABNORMAL
GRAM STAIN RESULT: ABNORMAL
GRAM STAIN RESULT: ABNORMAL

## 2021-11-06 LAB — BACTERIA ABSC ANAEROBE+AEROBE CULT: NORMAL

## 2021-11-10 DIAGNOSIS — Z11.59 ENCOUNTER FOR SCREENING FOR OTHER VIRAL DISEASES: ICD-10-CM

## 2021-11-11 ENCOUNTER — LAB (OUTPATIENT)
Dept: LAB | Facility: CLINIC | Age: 56
End: 2021-11-11
Attending: NURSE PRACTITIONER
Payer: COMMERCIAL

## 2021-11-11 DIAGNOSIS — Z11.59 ENCOUNTER FOR SCREENING FOR OTHER VIRAL DISEASES: ICD-10-CM

## 2021-11-11 PROCEDURE — U0003 INFECTIOUS AGENT DETECTION BY NUCLEIC ACID (DNA OR RNA); SEVERE ACUTE RESPIRATORY SYNDROME CORONAVIRUS 2 (SARS-COV-2) (CORONAVIRUS DISEASE [COVID-19]), AMPLIFIED PROBE TECHNIQUE, MAKING USE OF HIGH THROUGHPUT TECHNOLOGIES AS DESCRIBED BY CMS-2020-01-R: HCPCS

## 2021-11-11 PROCEDURE — U0005 INFEC AGEN DETEC AMPLI PROBE: HCPCS

## 2021-11-12 LAB — SARS-COV-2 RNA RESP QL NAA+PROBE: NEGATIVE

## 2021-11-15 ENCOUNTER — HOSPITAL ENCOUNTER (OUTPATIENT)
Dept: CT IMAGING | Facility: CLINIC | Age: 56
Discharge: HOME | End: 2021-11-15
Attending: NURSE PRACTITIONER | Admitting: NURSE PRACTITIONER
Payer: COMMERCIAL

## 2021-11-15 ENCOUNTER — HOSPITAL ENCOUNTER (OUTPATIENT)
Dept: INTERVENTIONAL RADIOLOGY/VASCULAR | Facility: CLINIC | Age: 56
Setting detail: RADIATION/ONCOLOGY SERIES
End: 2021-11-15
Attending: NURSE PRACTITIONER
Payer: COMMERCIAL

## 2021-11-15 VITALS
SYSTOLIC BLOOD PRESSURE: 147 MMHG | RESPIRATION RATE: 18 BRPM | DIASTOLIC BLOOD PRESSURE: 66 MMHG | OXYGEN SATURATION: 98 % | HEART RATE: 88 BPM

## 2021-11-15 DIAGNOSIS — K65.1 PERITONEAL ABSCESS (H): ICD-10-CM

## 2021-11-15 PROCEDURE — 74160 CT ABDOMEN W/CONTRAST: CPT

## 2021-11-15 PROCEDURE — 49424 ASSESS CYST CONTRAST INJECT: CPT

## 2021-11-15 PROCEDURE — 76080 X-RAY EXAM OF FISTULA: CPT

## 2021-11-15 PROCEDURE — 250N000011 HC RX IP 250 OP 636: Performed by: NURSE PRACTITIONER

## 2021-11-15 PROCEDURE — 255N000002 HC RX 255 OP 636: Performed by: NURSE PRACTITIONER

## 2021-11-15 RX ORDER — IOPAMIDOL 755 MG/ML
100 INJECTION, SOLUTION INTRAVASCULAR ONCE
Status: COMPLETED | OUTPATIENT
Start: 2021-11-15 | End: 2021-11-15

## 2021-11-15 RX ORDER — LIDOCAINE 40 MG/G
CREAM TOPICAL
Status: DISCONTINUED | OUTPATIENT
Start: 2021-11-15 | End: 2021-11-16 | Stop reason: HOSPADM

## 2021-11-15 RX ADMIN — IOPAMIDOL 100 ML: 755 INJECTION, SOLUTION INTRAVENOUS at 11:44

## 2021-11-15 RX ADMIN — IOHEXOL 50 ML: 350 INJECTION, SOLUTION INTRAVENOUS at 13:33

## 2021-11-15 NOTE — PROGRESS NOTES
Patient Name: Vira Fallon  Medical Record Number: [id number]  Today's Date: 11/15/2021    Procedure: Abscessogram with Abscess tube removal  Proceduralist: Dr. Jaren Choe  Pathology present: n/a    Procedure Start: 1325  Procedure end: 1333  Sedation medications administered: n/a    Report given to: JERMAN [facility] IR  : n/a    Other Notes: Pt arrived to IR room #1 from IR Pre / Post 3. Consent reviewed. Pt denies any questions or concerns regarding procedure. Pt positioned supine and monitored per protocol. Pt tolerated procedure without any noted complications. Pt transferred back to IR Pre / Post 3.

## 2021-11-15 NOTE — H&P
Interventional Radiology - Pre-Procedure Note:  11/15/2021    Procedure Requested: Abscessogram  Requested by: NOEMI Cuevas CNP    History and Physical Reviewed: H&P documented within 30 days (by Angel Nielson MD on 10/30/21).  I have personally reviewed the patient's medical history and have updated the medical record as necessary.    Brief HPI: Vira Fallon is a 56 year old female with history of recent lap cholecystectomy for severe cholecystitis 10/18/21 who was readmitted with post-op gallbladder fossa abscess s/p US guided 10 Fr drain placement 10/30/21. Patient presents today for follow up CT/abscessogram.     Drain cultures: Enterobacter cloacae complex  Drain flushing: 10 mL NS once daily  Drain outputs: To be discussed with patient by NICKOLAS CORONADO     IMAGING:  EXAM: CT ABDOMEN W/O CONTRAST  LOCATION: [facility]  DATE/TIME: 11/1/2021 10:28 AM     INDICATION: Follow up gallbladder fossa abscess, status post ultrasound drainage  COMPARISON: CT 10/30/2021  TECHNIQUE: CT scan of the abdomen was performed without IV contrast. Multiplanar reformats were obtained. Dose reduction techniques were used.   CONTRAST: None.     FINDINGS:    LOWER CHEST: Bibasilar atelectasis versus scarring.     HEPATOBILIARY: Postcholecystectomy. Residual gallbladder fossa abscess measuring up to 4.0 x 2.2 x 7.9 cm, previously 6.1 x 3.2 x 10.6 cm. Interval placement of a percutaneous drain into the abscess with the pigtail loop in the central aspect of the   abscess, best seen on coronal imaging. Extensive surrounding edema extending into the right lateral abdominal wall is similar to the prior exam. Small amount of subcutaneous air within the right lateral abdominal wall likely related to sequelae of   intervention.     PANCREAS: Normal.     SPLEEN: Normal.     ADRENAL GLANDS: Normal.     KIDNEYS/BLADDER: Normal.     BOWEL: Stable gastric antral wall edema likely secondary to the inflammatory process around the  gallbladder fossa abscess.     LYMPH NODES: Normal.     VASCULATURE: Unremarkable.     MUSCULOSKELETAL: Normal.                                                                      IMPRESSION:   1.  Status post percutaneous drain placement into the gallbladder fossa abscess which is mild to moderately decreased in size since the CT from 10/30/2021. The pigtail loop of the catheter is centered within the abscess.    NPO: To be verified by IR staff  ANTICOAGULANTS: None  ANTIBIOTICS: Flagyl     ALLERGIES  Allergies   Allergen Reactions     Animal Dander Itching and Shortness Of Breath     Dust Mites Unknown     Mold Unknown     Perfume Headache and Itching         LABS:    Hemoglobin   Date Value Ref Range Status   11/01/2021 7.3 (L) 11.7 - 15.7 g/dL Final     Platelet Count   Date Value Ref Range Status   11/01/2021 398 150 - 450 10e3/uL Final     Creatinine   Date Value Ref Range Status   11/01/2021 0.78 0.60 - 1.10 mg/dL Final     Potassium   Date Value Ref Range Status   11/01/2021 4.2 3.5 - 5.0 mmol/L Final       EXAM:  There were no vitals taken for this visit.  To be completed by NICKOLAS CORONADO    Pre-Sedation Assessment:  To be completed by NICKOLAS CORONADO    ASSESSMENT/PLAN:   56 year old female with recent lap pam, GB fossa abscess s/p US guided 10 Fr drain placement 10/30/21    Abscessogram with possible drain reposition, exchange or removal with sedation as needed    Procedure, risks/benefits, details, alternatives, and sedation to be reviewed with patient/family by NICKOLAS CORONADO. All questions answered. OK to proceed with above radiology procedure.     Chart review/note by:  Molly Cuevas CNP  Interventional Radiology    PE/Consent to be done by:  NICKOLAS CORONADO

## 2021-11-15 NOTE — IP AVS SNAPSHOT
[facility] Interventional Radiology  [address] [address]  Phone: [phone]  Fax: [phone]                                  After Visit Summary   11/15/2021    Vira Fallon   MRN: [id number]           After Visit Summary Signature Page    I have received my discharge instructions, and my questions have been answered. I have discussed any challenges I see with this plan with the nurse or doctor.    ..........................................................................................................................................  Patient/Patient Representative Signature      ..........................................................................................................................................  Patient Representative Print Name and Relationship to Patient    ..................................................               ................................................  Date                                   Time    ..........................................................................................................................................  Reviewed by Signature/Title    ...................................................              ..............................................  Date                                               Time          22EPIC Rev 08/18

## 2021-11-15 NOTE — PROCEDURES
Brief IR procedure note:    Patient with indwelling drain in GB fossa for post-op abscess.    CT shows near-complete resolution of collection with minimal residual fluid    Output has been minimal and serous.    Completed abx  No fevers/chills/pain    Abscessogram shows a collapsed abscess cavity with subsequent contrast reflux along drain tract    Therefore, the drain is removed.  No complications    F/up with surgeon as scheduled.  Patient advised to seek medical attention if she has recurrent pain, fevers, chills or other concerns    Jaren Choe MD  Interventional Radiology

## 2021-11-15 NOTE — DISCHARGE INSTRUCTIONS
Drain/Tube Removal Discharge Instructions:  Please follow the below instructions following your drain/tube removal.    Care Instructions after drain/tube removal:  - OK to shower after removal of your drain/tube.  - Avoid stagnant water such as tub baths, Jacuzzis and pools for 3 days after drain/tube removal.  - Keep previous drain/tube exit site covered with dressing of your preference until drainage stops. Change dressings daily and as needed to keep site dry and clean.  - Expect drainage will stop in approximately 3 days time.    Seek medical evaluation for the following:  - Fever (greater than 101 F (38.3C)).  - Purulent (yellow/green/foul smelling) drainage from previous drain exit site.  - Develop pain at or near the previous drain exit site.  - Persistent drainage lasting longer than three days from previous drain exit site.

## 2021-12-01 ENCOUNTER — IMMUNIZATION (OUTPATIENT)
Dept: NURSING | Facility: CLINIC | Age: 56
End: 2021-12-01
Payer: COMMERCIAL

## 2021-12-01 PROCEDURE — 0004A PR COVID VAC PFIZER DIL RECON 30 MCG/0.3 ML IM: CPT

## 2021-12-01 PROCEDURE — 91300 PR COVID VAC PFIZER DIL RECON 30 MCG/0.3 ML IM: CPT

## 2021-12-13 ENCOUNTER — HOSPITAL ENCOUNTER (OUTPATIENT)
Dept: MAMMOGRAPHY | Facility: CLINIC | Age: 56
Discharge: HOME | End: 2021-12-13
Attending: STUDENT IN AN ORGANIZED HEALTH CARE EDUCATION/TRAINING PROGRAM | Admitting: STUDENT IN AN ORGANIZED HEALTH CARE EDUCATION/TRAINING PROGRAM
Payer: COMMERCIAL

## 2021-12-13 DIAGNOSIS — Z12.31 VISIT FOR SCREENING MAMMOGRAM: ICD-10-CM

## 2021-12-13 PROCEDURE — 77067 SCR MAMMO BI INCL CAD: CPT

## 2021-12-17 ENCOUNTER — LAB REQUISITION (OUTPATIENT)
Dept: LAB | Facility: CLINIC | Age: 56
End: 2021-12-17
Payer: COMMERCIAL

## 2021-12-17 DIAGNOSIS — D64.9 ANEMIA, UNSPECIFIED: ICD-10-CM

## 2021-12-17 LAB
FERRITIN SERPL-MCNC: 57 NG/ML (ref 10–130)
IRON SATN MFR SERPL: 25 % (ref 20–50)
IRON SERPL-MCNC: 67 UG/DL (ref 42–175)
TIBC SERPL-MCNC: 264 UG/DL (ref 313–563)
TRANSFERRIN SERPL-MCNC: 211 MG/DL (ref 212–360)

## 2021-12-17 PROCEDURE — 84466 ASSAY OF TRANSFERRIN: CPT | Mod: ORL | Performed by: STUDENT IN AN ORGANIZED HEALTH CARE EDUCATION/TRAINING PROGRAM

## 2021-12-17 PROCEDURE — 82728 ASSAY OF FERRITIN: CPT | Mod: ORL | Performed by: STUDENT IN AN ORGANIZED HEALTH CARE EDUCATION/TRAINING PROGRAM

## 2022-05-05 ENCOUNTER — LAB REQUISITION (OUTPATIENT)
Dept: LAB | Facility: CLINIC | Age: 57
End: 2022-05-05
Payer: COMMERCIAL

## 2022-05-05 DIAGNOSIS — Z86.39 PERSONAL HISTORY OF OTHER ENDOCRINE, NUTRITIONAL AND METABOLIC DISEASE: ICD-10-CM

## 2022-05-05 DIAGNOSIS — E72.11 HOMOCYSTINURIA (H): ICD-10-CM

## 2022-05-05 DIAGNOSIS — R53.83 OTHER FATIGUE: ICD-10-CM

## 2022-05-05 DIAGNOSIS — D64.9 ANEMIA, UNSPECIFIED: ICD-10-CM

## 2022-05-05 LAB
ALBUMIN SERPL-MCNC: 4 G/DL (ref 3.5–5)
ALP SERPL-CCNC: 81 U/L (ref 45–120)
ALT SERPL W P-5'-P-CCNC: 18 U/L (ref 0–45)
ANION GAP SERPL CALCULATED.3IONS-SCNC: 11 MMOL/L (ref 5–18)
AST SERPL W P-5'-P-CCNC: 18 U/L (ref 0–40)
BILIRUB SERPL-MCNC: 0.4 MG/DL (ref 0–1)
BUN SERPL-MCNC: 19 MG/DL (ref 8–22)
C REACTIVE PROTEIN LHE: 0.6 MG/DL (ref 0–0.8)
CALCIUM SERPL-MCNC: 9 MG/DL (ref 8.5–10.5)
CHLORIDE BLD-SCNC: 105 MMOL/L (ref 98–107)
CO2 SERPL-SCNC: 24 MMOL/L (ref 22–31)
CREAT SERPL-MCNC: 0.81 MG/DL (ref 0.6–1.1)
ERYTHROCYTE [SEDIMENTATION RATE] IN BLOOD BY WESTERGREN METHOD: 17 MM/HR (ref 0–20)
FERRITIN SERPL-MCNC: 43 NG/ML (ref 10–130)
GFR SERPL CREATININE-BSD FRML MDRD: 86 ML/MIN/1.73M2
GLUCOSE BLD-MCNC: 109 MG/DL (ref 70–125)
POTASSIUM BLD-SCNC: 3.8 MMOL/L (ref 3.5–5)
PROT SERPL-MCNC: 6.7 G/DL (ref 6–8)
SODIUM SERPL-SCNC: 140 MMOL/L (ref 136–145)
T4 FREE SERPL-MCNC: 0.91 NG/DL (ref 0.7–1.8)
TSH SERPL DL<=0.005 MIU/L-ACNC: 2.03 UIU/ML (ref 0.3–5)
VIT B12 SERPL-MCNC: 311 PG/ML (ref 213–816)

## 2022-05-05 PROCEDURE — 84443 ASSAY THYROID STIM HORMONE: CPT | Mod: ORL | Performed by: STUDENT IN AN ORGANIZED HEALTH CARE EDUCATION/TRAINING PROGRAM

## 2022-05-05 PROCEDURE — 82607 VITAMIN B-12: CPT | Mod: ORL | Performed by: STUDENT IN AN ORGANIZED HEALTH CARE EDUCATION/TRAINING PROGRAM

## 2022-05-05 PROCEDURE — 82728 ASSAY OF FERRITIN: CPT | Mod: ORL | Performed by: STUDENT IN AN ORGANIZED HEALTH CARE EDUCATION/TRAINING PROGRAM

## 2022-05-05 PROCEDURE — 86038 ANTINUCLEAR ANTIBODIES: CPT | Mod: ORL | Performed by: STUDENT IN AN ORGANIZED HEALTH CARE EDUCATION/TRAINING PROGRAM

## 2022-05-05 PROCEDURE — 86140 C-REACTIVE PROTEIN: CPT | Mod: ORL | Performed by: STUDENT IN AN ORGANIZED HEALTH CARE EDUCATION/TRAINING PROGRAM

## 2022-05-05 PROCEDURE — 80053 COMPREHEN METABOLIC PANEL: CPT | Mod: ORL | Performed by: STUDENT IN AN ORGANIZED HEALTH CARE EDUCATION/TRAINING PROGRAM

## 2022-05-05 PROCEDURE — 83921 ORGANIC ACID SINGLE QUANT: CPT | Mod: ORL | Performed by: STUDENT IN AN ORGANIZED HEALTH CARE EDUCATION/TRAINING PROGRAM

## 2022-05-05 PROCEDURE — 84439 ASSAY OF FREE THYROXINE: CPT | Mod: ORL | Performed by: STUDENT IN AN ORGANIZED HEALTH CARE EDUCATION/TRAINING PROGRAM

## 2022-05-05 PROCEDURE — 85652 RBC SED RATE AUTOMATED: CPT | Mod: ORL | Performed by: STUDENT IN AN ORGANIZED HEALTH CARE EDUCATION/TRAINING PROGRAM

## 2022-05-06 PROCEDURE — 83090 ASSAY OF HOMOCYSTEINE: CPT | Mod: ORL | Performed by: STUDENT IN AN ORGANIZED HEALTH CARE EDUCATION/TRAINING PROGRAM

## 2022-05-09 LAB
ANA PAT SER IF-IMP: ABNORMAL
ANA SER QL IF: ABNORMAL
ANA TITR SER IF: ABNORMAL {TITER}

## 2022-05-10 LAB
FASTING STATUS PATIENT QL REPORTED: NORMAL
HOMOCYSTEINE LHE: 12 UMOL/L (ref 0–13)
METHYLMALONATE SERPL-SCNC: 0.18 UMOL/L (ref 0–0.4)

## 2022-08-31 ENCOUNTER — LAB REQUISITION (OUTPATIENT)
Dept: LAB | Facility: CLINIC | Age: 57
End: 2022-08-31
Payer: COMMERCIAL

## 2022-08-31 DIAGNOSIS — D64.9 ANEMIA, UNSPECIFIED: ICD-10-CM

## 2022-08-31 LAB — FERRITIN SERPL-MCNC: 66 NG/ML (ref 11–328)

## 2022-08-31 PROCEDURE — 82728 ASSAY OF FERRITIN: CPT | Mod: ORL | Performed by: STUDENT IN AN ORGANIZED HEALTH CARE EDUCATION/TRAINING PROGRAM

## 2022-10-01 ENCOUNTER — HEALTH MAINTENANCE LETTER (OUTPATIENT)
Age: 57
End: 2022-10-01

## 2022-12-07 ENCOUNTER — IMMUNIZATION (OUTPATIENT)
Dept: NURSING | Facility: CLINIC | Age: 57
End: 2022-12-07
Payer: COMMERCIAL

## 2022-12-07 PROCEDURE — 0124A COVID-19 VACCINE BIVALENT BOOSTER 12+ (PFIZER): CPT

## 2022-12-07 PROCEDURE — 91312 COVID-19 VACCINE BIVALENT BOOSTER 12+ (PFIZER): CPT

## 2022-12-30 ENCOUNTER — LAB REQUISITION (OUTPATIENT)
Dept: LAB | Facility: CLINIC | Age: 57
End: 2022-12-30
Payer: COMMERCIAL

## 2022-12-30 DIAGNOSIS — K12.1 OTHER FORMS OF STOMATITIS: ICD-10-CM

## 2022-12-30 DIAGNOSIS — L65.9 NONSCARRING HAIR LOSS, UNSPECIFIED: ICD-10-CM

## 2022-12-30 LAB
FERRITIN SERPL-MCNC: 67 NG/ML (ref 11–328)
T4 FREE SERPL-MCNC: 0.92 NG/DL (ref 0.9–1.7)
TSH SERPL DL<=0.005 MIU/L-ACNC: 2.6 UIU/ML (ref 0.3–4.2)
VIT B12 SERPL-MCNC: 494 PG/ML (ref 232–1245)

## 2022-12-30 PROCEDURE — 84403 ASSAY OF TOTAL TESTOSTERONE: CPT | Mod: ORL | Performed by: STUDENT IN AN ORGANIZED HEALTH CARE EDUCATION/TRAINING PROGRAM

## 2022-12-30 PROCEDURE — 86800 THYROGLOBULIN ANTIBODY: CPT | Mod: ORL | Performed by: STUDENT IN AN ORGANIZED HEALTH CARE EDUCATION/TRAINING PROGRAM

## 2022-12-30 PROCEDURE — 84439 ASSAY OF FREE THYROXINE: CPT | Mod: ORL | Performed by: STUDENT IN AN ORGANIZED HEALTH CARE EDUCATION/TRAINING PROGRAM

## 2022-12-30 PROCEDURE — 82728 ASSAY OF FERRITIN: CPT | Mod: ORL | Performed by: STUDENT IN AN ORGANIZED HEALTH CARE EDUCATION/TRAINING PROGRAM

## 2022-12-30 PROCEDURE — 84443 ASSAY THYROID STIM HORMONE: CPT | Mod: ORL | Performed by: STUDENT IN AN ORGANIZED HEALTH CARE EDUCATION/TRAINING PROGRAM

## 2022-12-30 PROCEDURE — 82306 VITAMIN D 25 HYDROXY: CPT | Mod: ORL | Performed by: STUDENT IN AN ORGANIZED HEALTH CARE EDUCATION/TRAINING PROGRAM

## 2022-12-30 PROCEDURE — 82607 VITAMIN B-12: CPT | Mod: ORL | Performed by: STUDENT IN AN ORGANIZED HEALTH CARE EDUCATION/TRAINING PROGRAM

## 2022-12-31 LAB — DEPRECATED CALCIDIOL+CALCIFEROL SERPL-MC: 37 UG/L (ref 20–75)

## 2023-01-02 LAB — THYROGLOB AB SERPL IA-ACNC: <20 IU/ML

## 2023-01-04 LAB — TESTOST SERPL-MCNC: 2 NG/DL (ref 8–60)

## 2023-01-06 ENCOUNTER — LAB REQUISITION (OUTPATIENT)
Dept: LAB | Facility: CLINIC | Age: 58
End: 2023-01-06
Payer: COMMERCIAL

## 2023-01-06 DIAGNOSIS — L65.9 NONSCARRING HAIR LOSS, UNSPECIFIED: ICD-10-CM

## 2023-01-10 PROCEDURE — 84630 ASSAY OF ZINC: CPT | Mod: ORL | Performed by: STUDENT IN AN ORGANIZED HEALTH CARE EDUCATION/TRAINING PROGRAM

## 2023-01-12 LAB — ZINC SERPL-MCNC: 66.7 UG/DL

## 2023-06-14 ENCOUNTER — LAB REQUISITION (OUTPATIENT)
Dept: LAB | Facility: CLINIC | Age: 58
End: 2023-06-14
Payer: COMMERCIAL

## 2023-06-14 DIAGNOSIS — Z86.39 PERSONAL HISTORY OF OTHER ENDOCRINE, NUTRITIONAL AND METABOLIC DISEASE: ICD-10-CM

## 2023-06-14 DIAGNOSIS — K90.89 OTHER INTESTINAL MALABSORPTION: ICD-10-CM

## 2023-06-14 DIAGNOSIS — R79.89 OTHER SPECIFIED ABNORMAL FINDINGS OF BLOOD CHEMISTRY: ICD-10-CM

## 2023-06-14 DIAGNOSIS — L65.9 NONSCARRING HAIR LOSS, UNSPECIFIED: ICD-10-CM

## 2023-06-14 LAB
CHOLEST SERPL-MCNC: 161 MG/DL
HDLC SERPL-MCNC: 77 MG/DL
LDLC SERPL CALC-MCNC: 71 MG/DL
NONHDLC SERPL-MCNC: 84 MG/DL
T4 FREE SERPL-MCNC: 1.28 NG/DL (ref 0.9–1.7)
TRIGL SERPL-MCNC: 67 MG/DL
TSH SERPL DL<=0.005 MIU/L-ACNC: 2.41 UIU/ML (ref 0.3–4.2)

## 2023-06-14 PROCEDURE — 86038 ANTINUCLEAR ANTIBODIES: CPT | Mod: ORL | Performed by: STUDENT IN AN ORGANIZED HEALTH CARE EDUCATION/TRAINING PROGRAM

## 2023-06-14 PROCEDURE — 80061 LIPID PANEL: CPT | Mod: ORL | Performed by: STUDENT IN AN ORGANIZED HEALTH CARE EDUCATION/TRAINING PROGRAM

## 2023-06-14 PROCEDURE — 84439 ASSAY OF FREE THYROXINE: CPT | Mod: ORL | Performed by: STUDENT IN AN ORGANIZED HEALTH CARE EDUCATION/TRAINING PROGRAM

## 2023-06-14 PROCEDURE — 84443 ASSAY THYROID STIM HORMONE: CPT | Mod: ORL | Performed by: STUDENT IN AN ORGANIZED HEALTH CARE EDUCATION/TRAINING PROGRAM

## 2023-06-14 PROCEDURE — 84403 ASSAY OF TOTAL TESTOSTERONE: CPT | Mod: ORL | Performed by: STUDENT IN AN ORGANIZED HEALTH CARE EDUCATION/TRAINING PROGRAM

## 2023-06-15 LAB
ANA PAT SER IF-IMP: ABNORMAL
ANA SER QL IF: ABNORMAL
ANA TITR SER IF: ABNORMAL {TITER}

## 2023-06-16 LAB — TESTOST SERPL-MCNC: 4 NG/DL (ref 8–60)

## 2023-09-25 ENCOUNTER — LAB REQUISITION (OUTPATIENT)
Dept: LAB | Facility: CLINIC | Age: 58
End: 2023-09-25
Payer: COMMERCIAL

## 2023-09-25 DIAGNOSIS — R23.8 OTHER SKIN CHANGES: ICD-10-CM

## 2023-09-25 PROCEDURE — 87798 DETECT AGENT NOS DNA AMP: CPT | Mod: ORL | Performed by: FAMILY MEDICINE

## 2023-09-26 LAB — VZV DNA SPEC QL NAA+PROBE: NOT DETECTED

## 2023-10-15 ENCOUNTER — HEALTH MAINTENANCE LETTER (OUTPATIENT)
Age: 58
End: 2023-10-15

## 2023-11-27 ENCOUNTER — HOSPITAL ENCOUNTER (OUTPATIENT)
Dept: MAMMOGRAPHY | Facility: CLINIC | Age: 58
Discharge: HOME | End: 2023-11-27
Attending: STUDENT IN AN ORGANIZED HEALTH CARE EDUCATION/TRAINING PROGRAM | Admitting: STUDENT IN AN ORGANIZED HEALTH CARE EDUCATION/TRAINING PROGRAM
Payer: COMMERCIAL

## 2023-11-27 DIAGNOSIS — Z12.31 VISIT FOR SCREENING MAMMOGRAM: ICD-10-CM

## 2023-11-27 PROCEDURE — 77067 SCR MAMMO BI INCL CAD: CPT

## 2024-05-13 ENCOUNTER — LAB REQUISITION (OUTPATIENT)
Dept: LAB | Facility: CLINIC | Age: 59
End: 2024-05-13
Payer: COMMERCIAL

## 2024-05-13 DIAGNOSIS — E55.9 VITAMIN D DEFICIENCY, UNSPECIFIED: ICD-10-CM

## 2024-05-13 DIAGNOSIS — Z86.39 PERSONAL HISTORY OF OTHER ENDOCRINE, NUTRITIONAL AND METABOLIC DISEASE: ICD-10-CM

## 2024-05-13 DIAGNOSIS — K90.0 CELIAC DISEASE: ICD-10-CM

## 2024-05-13 DIAGNOSIS — E72.11 HOMOCYSTINURIA (H): ICD-10-CM

## 2024-05-13 LAB — FOLATE SERPL-MCNC: >40 NG/ML (ref 4.6–34.8)

## 2024-05-13 PROCEDURE — 84439 ASSAY OF FREE THYROXINE: CPT | Mod: ORL | Performed by: STUDENT IN AN ORGANIZED HEALTH CARE EDUCATION/TRAINING PROGRAM

## 2024-05-13 PROCEDURE — 84443 ASSAY THYROID STIM HORMONE: CPT | Mod: ORL | Performed by: STUDENT IN AN ORGANIZED HEALTH CARE EDUCATION/TRAINING PROGRAM

## 2024-05-13 PROCEDURE — 82746 ASSAY OF FOLIC ACID SERUM: CPT | Mod: ORL | Performed by: STUDENT IN AN ORGANIZED HEALTH CARE EDUCATION/TRAINING PROGRAM

## 2024-05-13 PROCEDURE — 82306 VITAMIN D 25 HYDROXY: CPT | Mod: ORL | Performed by: STUDENT IN AN ORGANIZED HEALTH CARE EDUCATION/TRAINING PROGRAM

## 2024-05-13 PROCEDURE — 82607 VITAMIN B-12: CPT | Mod: ORL | Performed by: STUDENT IN AN ORGANIZED HEALTH CARE EDUCATION/TRAINING PROGRAM

## 2024-05-14 LAB
T4 FREE SERPL-MCNC: 1.14 NG/DL (ref 0.9–1.7)
TSH SERPL DL<=0.005 MIU/L-ACNC: 2.61 UIU/ML (ref 0.3–4.2)
VIT B12 SERPL-MCNC: 2757 PG/ML (ref 232–1245)
VIT D+METAB SERPL-MCNC: 43 NG/ML (ref 20–50)

## 2024-06-07 NOTE — PROGRESS NOTES
Medication Therapy Management (MTM) Encounter    ASSESSMENT:                            Medication Adherence/Access: No issues identified    Weight Management:   Patient could benefit from Wegovy, titrating up as tolerated. This is also available as semaglutide compound if not covered by her insurance. Patient is willing to try either option. Topiramate could also be an option, given efficacy for migraines as well.    Asthma   Stable    Migraines:  Could benefit from additional therapy. [facility], so if that goes through, that is a good option. Could consider topiramate in the future for prevention and weight loss benefits. Patient was open to this if Nurtec was denied.    Seasonal Allergies:  Stable    Oral Lichen Planus:   Stable    Supplements:  Stable. May not need some of them, but patient insists on continuing. States she will discuss with me before starting more in the future.    PLAN:                            I am sending in Wegovy 0.25 mg to your [facility] pharmacy. If it is covered, start right away. If not, please let the clinic know and we can move forward with the compounded semaglutide (generic Wegovy)     Follow-up: Return in about 5 weeks (around 7/15/2024) for Follow up, in person @ 3:00pm.    Future considerations: Topiramate and/or compounded semaglutide.    SUBJECTIVE/OBJECTIVE:                          Vira Fallon is a 58 year old female coming in for an initial visit. She was referred to me from Dr. Drew.      Reason for visit: Medication Therapy Management.    Allergies/ADRs: Reviewed in chart  Past Medical History: Reviewed in chart  Tobacco: She reports that she has never smoked. She has never used smokeless tobacco.  Alcohol: none    Medication Adherence/Access: no issues reported      Weight Management   Patient tried metformin in the past but did not tolerate. Did not lose any weight from this medication. States she was told she was insulin resistant by a past  PCP  Nutrition/Eating Habits: Typical day: Usually eats yogurt in the morning with gluten free biscuits. Protein shakes for lunch with almond butter and half a banana. Dinner is fresh vegetables and fruit, occasional gluten free chicken strips or a salad. Doesn't feel hungry often. Will have bigger meals on weekends. Occasionally has potatoes.   Exercise/Activity: Asthma, chronic fatigue, and bad knees limit physical activity. Celiac disease also limits patient food options. Will walk about 20 to 30 minutes Monday through Friday.  Has seen a dietary specialists in the past. Doesn't feel like it helped her.        Asthma   Albuterol HFA - two puffs every four hours as needed  Albuterol nebulizer solution - one vial inhaled via nebulizer every 6 hours as needed  Patient reports no current medication side effects.      Triggers include: smoke, pollens, animal dander, and strong odors and fumes.  Patient reports the following symptoms: none. Feels well controlled with albuterol.      Migraines:  Amitriptyline 25 mg every night  Acetaminophen 500 mg - one to two tablets every 6 hours as needed. Uses nightly.  Medication Awaiting PA: Nurtec 75 mg ODT    Past Medications:   Sumatriptan 50 mg - no longer tolerates, was taken off.      Seasonal Allergies:  Cetirizine 10 mg tablet one to two times daily  Fluticasone 50 mcg/act - two sprays in each nostril twice daily    Oral Lichen Planus:   Clobetasol propionate 0.05% ointment - applied four times daily  No side effects. Feels it is helping    Supplements:   Sees a chiropractor/supplement specialist. Has her taking various supplements that change frequently.  Melatonin 6 mg nightly - liquid  Ashwagandha gummies nightly    Today's Vitals: There were no vitals taken for this visit.  ----------------      I spent 60 minutes with this patient today. All changes were made via collaborative practice agreement with Dr. Drew. A copy of the visit note was provided to the patient's  provider(s).    A summary of these recommendations was given to the patient.    Marv Mendoza, PharmD, SAMIA, BCACP  Medication Therapy Management Pharmacist        Medication Therapy Recommendations  Encounter for weight management    Rationale: Untreated condition - Needs additional medication therapy - Indication   Recommendation: Start Medication - Wegovy 0.25 MG/0.5ML Soaj   Status: Accepted per CPA

## 2024-06-10 ENCOUNTER — OFFICE VISIT (OUTPATIENT)
Dept: PHARMACY | Facility: PHYSICIAN GROUP | Age: 59
End: 2024-06-10
Payer: COMMERCIAL

## 2024-06-10 DIAGNOSIS — J30.2 SEASONAL ALLERGIC RHINITIS: ICD-10-CM

## 2024-06-10 DIAGNOSIS — Z76.89 ENCOUNTER FOR WEIGHT MANAGEMENT: Primary | ICD-10-CM

## 2024-06-10 DIAGNOSIS — Z78.9 TAKES DIETARY SUPPLEMENTS: ICD-10-CM

## 2024-06-10 DIAGNOSIS — G43.111 INTRACTABLE MIGRAINE WITH AURA WITH STATUS MIGRAINOSUS: ICD-10-CM

## 2024-06-10 DIAGNOSIS — J45.909 EXTRINSIC ASTHMA WITHOUT COMPLICATION, UNSPECIFIED ASTHMA SEVERITY, UNSPECIFIED WHETHER PERSISTENT: ICD-10-CM

## 2024-06-10 DIAGNOSIS — L43.8 ORAL LICHEN PLANUS: ICD-10-CM

## 2024-06-10 PROCEDURE — 99207 PR NO CHARGE LOS: CPT | Performed by: PHARMACIST

## 2024-06-10 RX ORDER — ALBUTEROL SULFATE 0.83 MG/ML
2.5 SOLUTION RESPIRATORY (INHALATION) EVERY 6 HOURS PRN
COMMUNITY

## 2024-06-10 RX ORDER — CLOBETASOL PROPIONATE 0.5 MG/G
OINTMENT TOPICAL 4 TIMES DAILY PRN
COMMUNITY

## 2024-06-10 NOTE — PATIENT INSTRUCTIONS
"Recommendations from today's MTM visit:                                                         I am sending in Wegovy 0.25 mg to your [facility] pharmacy. If it is covered, start right away. If not, please let the clinic know and we can move forward with the compounded semaglutide (generic Wegovy)    Follow-up: Return in about 5 weeks (around 7/15/2024) for Follow up, in person @ 3:00pm.    It was great speaking with you today.  I value your experience and would be very thankful for your time in providing feedback in our clinic survey. In the next few days, you may receive an email or text message from [employer] with a link to a survey related to your  clinical pharmacist.\"     To schedule another MTM appointment, please call the clinic directly or you may call the MTM scheduling line at [phone].    My Clinical Pharmacist's contact information:                                                      Please feel free to contact me with any questions or concerns you have.      Marv Mendoza PharmD, SAMIA, BCACP  Medication Therapy Management Pharmacist    "

## 2024-07-15 ENCOUNTER — OFFICE VISIT (OUTPATIENT)
Dept: PHARMACY | Facility: PHYSICIAN GROUP | Age: 59
End: 2024-07-15
Payer: COMMERCIAL

## 2024-07-15 DIAGNOSIS — Z76.89 ENCOUNTER FOR WEIGHT MANAGEMENT: Primary | ICD-10-CM

## 2024-07-15 PROCEDURE — 99207 PR NO CHARGE LOS: CPT | Performed by: PHARMACIST

## 2024-07-15 NOTE — PROGRESS NOTES
Medication Therapy Management (MTM) Encounter    ASSESSMENT:                            Medication Adherence/Access: No issues identified    Weight Management:   Patient would benefit from starting the semaglutide 0.25 mg weekly. Instructed patient to contact the compounding pharmacy to see if they will fill the medication.     Instructed patient to keep an eye on rash. If it spreads, worsens, or is deemed to be a reaction from the topiramate, we may need to discontinue.    PLAN:                            Phone number for [facility] pharmacy: [phone]. Use this to set up delivery of the semaglutide 0.25 mg weekly for weight loss     Follow-up: Return in 4 weeks (on 8/12/2024) for Follow up, in person @ 3:00PM.    Marv Mendoza, PharmD, SAMIA, BCACP  Medication Therapy Management Pharmacist     SUBJECTIVE/OBJECTIVE:                          Vira Fallon is a 59 year old female seen for a follow-up visit.       Reason for visit: Weight Management.    Allergies/ADRs: Reviewed in chart  Past Medical History: Reviewed in chart  Tobacco: She reports that she has never smoked. She has never used smokeless tobacco.  Alcohol: none    Medication Adherence/Access: no issues reported    Weight Management   Topiramate 50 mg twice daily - has been tolerating well. Just moved to this dose on Saturday. Does state it is helping her migraines. She did mention a rash but isn't sure if it is related to a previous condition she was experiencing. Not spreading currently and is localized to her leg.   Has not started the semaglutide yet; states the compounding pharmacy never called her.  Patient tried metformin in the past but did not tolerate. Did not lose any weight from this medication. States she was told she was insulin resistant by a past PCP  Nutrition/Eating Habits: Typical day: Usually eats yogurt in the morning with gluten free biscuits. Protein shakes for lunch with almond butter and half a banana. Dinner is fresh  vegetables and fruit, occasional gluten free chicken strips or a salad. Doesn't feel hungry often. Will have bigger meals on weekends. Occasionally has potatoes.   Exercise/Activity: Asthma, chronic fatigue, and bad knees limit physical activity. Celiac disease also limits patient food options. Will walk about 20 to 30 minutes Monday through Friday.  Has seen a dietary specialists in the past. Doesn't feel like it helped her.          Today's Vitals: There were no vitals taken for this visit.  ----------------      I spent 15 minutes with this patient today. All changes were made via collaborative practice agreement with Barbie Drew MD. A copy of the visit note was provided to the patient's provider(s).    A summary of these recommendations was given to the patient.    Marv Mendoza, PharmD, SAMIA, BCACP  Medication Therapy Management Pharmacist          Medication Therapy Recommendations  Encounter for weight management    Rationale: Synergistic therapy - Needs additional medication therapy - Indication   Recommendation: Start Medication - Semaglutide-Weight Management 0.25 MG/0.5ML pen   Status: Accepted per CPA

## 2024-07-15 NOTE — PATIENT INSTRUCTIONS
"Recommendations from today's MTM visit:                                                         Phone number for [facility] pharmacy: [phone]. Use this to set up delivery of the semaglutide for weight loss    Follow-up: Return in 4 weeks (on 8/12/2024) for Follow up, in person @ 3:00PM.    It was great speaking with you today.  I value your experience and would be very thankful for your time in providing feedback in our clinic survey. In the next few days, you may receive an email or text message from [employer] with a link to a survey related to your  clinical pharmacist.\"     To schedule another MTM appointment, please call the clinic directly or you may call the MTM scheduling line at [phone].    My Clinical Pharmacist's contact information:                                                      Please feel free to contact me with any questions or concerns you have.      Marv Mendoza, Juvenal, SAMIA, BCACP  Medication Therapy Management Pharmacist    "

## 2024-08-12 ENCOUNTER — OFFICE VISIT (OUTPATIENT)
Dept: PHARMACY | Facility: PHYSICIAN GROUP | Age: 59
End: 2024-08-12
Payer: COMMERCIAL

## 2024-08-12 DIAGNOSIS — Z76.89 ENCOUNTER FOR WEIGHT MANAGEMENT: Primary | ICD-10-CM

## 2024-08-12 PROCEDURE — 99207 PR NO CHARGE LOS: CPT | Performed by: PHARMACIST

## 2024-08-12 NOTE — PATIENT INSTRUCTIONS
"Recommendations from today's MTM visit:                                                         Start taking Pepcid (famotidine) 20mg twice daily as needed for heartburn    I will send in a prescription for semaglutide 0.5 mg once weekly to the compounding pharmacy    Follow-up: Return in 4 weeks (on 9/9/2024) for Follow up, in person @ 3:00pm.    It was great speaking with you today.  I value your experience and would be very thankful for your time in providing feedback in our clinic survey. In the next few days, you may receive an email or text message from [employer]" with a link to a survey related to your  clinical pharmacist.\"     To schedule another MTM appointment, please call the clinic directly or you may call the MTM scheduling line at [phone].    My Clinical Pharmacist's contact information:                                                      Please feel free to contact me with any questions or concerns you have.      Marv Mendoza, Juvenal, SAMIA, BCACP  Medication Therapy Management Pharmacist    "

## 2024-08-12 NOTE — PROGRESS NOTES
Medication Therapy Management (MTM) Encounter    ASSESSMENT:                            Medication Adherence/Access: No issues identified    Weight Management:   Would benefit from increasing dose of semaglutide. Could also benefit from famotidine as needed for heartburn.      PLAN:                              Start taking Pepcid (famotidine) 20mg twice daily as needed for heartburn     I will send in a prescription for semaglutide 0.5 mg once weekly to the compounding pharmacy     Follow-up: Return in 4 weeks (on 9/9/2024) for Follow up, in person @ 3:00pm.    Marv Mendoza, PharmD, SAMIA, BCACP  Medication Therapy Management Pharmacist     SUBJECTIVE/OBJECTIVE:                          Vira Fallon is a 59 year old female seen for a follow-up visit.       Reason for visit: Weight Management.    Allergies/ADRs: Reviewed in chart  Past Medical History: Reviewed in chart  Tobacco: She reports that she has never smoked. She has never used smokeless tobacco.  Alcohol: none    Medication Adherence/Access: no issues reported    Weight Management   Topiramate 50 mg twice daily - states a rash that she was experiencing, but unsure if it was related to topriamate, has gone away. Tolerating well  Semaglutide 0.25 mg weekly. Has been tolerating well. She is experiencing occasional heartburn with the medication.  Patient tried metformin in the past but did not tolerate. Did not lose any weight from this medication. States she was told she was insulin resistant by a past PCP  Nutrition/Eating Habits: Typical day: Usually eats yogurt in the morning with gluten free biscuits. Protein shakes for lunch with almond butter and half a banana. Dinner is fresh vegetables and fruit, occasional gluten free chicken strips or a salad. Doesn't feel hungry often. Will have bigger meals on weekends. Occasionally has potatoes.   Exercise/Activity: Asthma, chronic fatigue, and bad knees limit physical activity. Celiac disease also limits patient  food options. Will walk about 20 to 30 minutes Monday through Friday.  Has seen a dietary specialists in the past. Doesn't feel like it helped her.          Today's Vitals: There were no vitals taken for this visit.  ----------------      I spent 20 minutes with this patient today. All changes were made via collaborative practice agreement with Barbie Drew MD. A copy of the visit note was provided to the patient's provider(s).    A summary of these recommendations was given to the patient.    Marv Mendoza, PharmD, SAMIA, BCACP  Medication Therapy Management Pharmacist          Medication Therapy Recommendations  Encounter for weight management    Current Medication: study - semaglutide, IDS# [id number], 0.25 MG/0.5ML prefilled pen   Rationale: Dose too low - Dosage too low - Effectiveness   Recommendation: Increase Dose   Status: Accepted per CPA

## 2024-09-09 ENCOUNTER — OFFICE VISIT (OUTPATIENT)
Dept: PHARMACY | Facility: PHYSICIAN GROUP | Age: 59
End: 2024-09-09

## 2024-09-09 VITALS
BODY MASS INDEX: 31.23 KG/M2 | DIASTOLIC BLOOD PRESSURE: 72 MMHG | SYSTOLIC BLOOD PRESSURE: 126 MMHG | HEART RATE: 86 BPM | OXYGEN SATURATION: 99 % | WEIGHT: 162.6 LBS

## 2024-09-09 DIAGNOSIS — Z76.89 ENCOUNTER FOR WEIGHT MANAGEMENT: Primary | ICD-10-CM

## 2024-09-09 PROCEDURE — 99207 PR NO CHARGE LOS: CPT | Performed by: PHARMACIST

## 2024-09-09 NOTE — PROGRESS NOTES
Medication Therapy Management (MTM) Encounter    ASSESSMENT:                            Medication Adherence/Access: No issues identified    Weight Management:   Would benefit from increasing dose of semaglutide to continue to see desired weight loss.      PLAN:                              Increase semaglutide to 1 mg once weekly     Follow-up: Return in 4 weeks (on 10/7/2024) for Follow up, in person @ 3:00pm.    Marv Mendoza, PharmD, SAMIA, BCACP  Medication Therapy Management Pharmacist     SUBJECTIVE/OBJECTIVE:                          Vira Fallon is a 59 year old female seen for a follow-up visit.       Reason for visit: Weight Management.    Allergies/ADRs: Reviewed in chart  Past Medical History: Reviewed in chart  Tobacco: She reports that she has never smoked. She has never used smokeless tobacco.  Alcohol: none    Medication Adherence/Access: no issues reported    Weight Management   Topiramate 50 mg twice daily - states a rash that she was experiencing, but unsure if it was related to topriamate, has gone away. Tolerating well  Semaglutide 0.5 mg weekly. Has been tolerating well. Heartburn is managed well with famotidine.  Patient tried metformin in the past but did not tolerate. Did not lose any weight from this medication. States she was told she was insulin resistant by a past PCP  Nutrition/Eating Habits: Typical day: Usually eats yogurt in the morning with gluten free biscuits. Protein shakes for lunch with almond butter and half a banana. Dinner is fresh vegetables and fruit, occasional gluten free chicken strips or a salad. Doesn't feel hungry often. Will have bigger meals on weekends. Occasionally has potatoes.   Exercise/Activity: Asthma, chronic fatigue, and bad knees limit physical activity. Celiac disease also limits patient food options. Will walk about 20 to 30 minutes Monday through Friday.  Has seen a dietary specialists in the past. Doesn't feel like it helped her.      Current weight  "today: 162 lbs 9.6 oz  Cumulative Weight Loss: -11 lb, -6.4 % from baseline    Starting weight: 173 lbs    Estimated body mass index is 31.23 kg/m  as calculated from the following:    Height as of 10/30/21: 5' 0.5\" (1.537 m).    Weight as of this encounter: 162 lb 9.6 oz (73.8 kg).         Today's Vitals: /72 (BP Location: Right arm)   Pulse 86   Wt 162 lb 9.6 oz (73.8 kg)   SpO2 99%   BMI 31.23 kg/m    ----------------      I spent 20 minutes with this patient today. All changes were made via collaborative practice agreement with Barbie Drew MD. A copy of the visit note was provided to the patient's provider(s).    A summary of these recommendations was given to the patient.    Marv Mendoza, PharmD, SAMIA, BCACP  Medication Therapy Management Pharmacist        Medication Therapy Recommendations  Encounter for weight management    Current Medication: study - semaglutide, IDS# [id number], 0.25 MG/0.5ML prefilled pen (Discontinued)   Rationale: Dose too low - Dosage too low - Effectiveness   Recommendation: Increase Dose   Status: Accepted per CPA             "

## 2024-09-09 NOTE — PATIENT INSTRUCTIONS
"Recommendations from today's MTM visit:                                                         Increase semaglutide to 1 mg once weekly    Follow-up: Return in 4 weeks (on 10/7/2024) for Follow up, in person @ 3:00pm.    It was great speaking with you today.  I value your experience and would be very thankful for your time in providing feedback in our clinic survey. In the next few days, you may receive an email or text message from [employer] with a link to a survey related to your  clinical pharmacist.\"     To schedule another MTM appointment, please call the clinic directly or you may call the MTM scheduling line at [phone].    My Clinical Pharmacist's contact information:                                                      Please feel free to contact me with any questions or concerns you have.      Marv Mendoza, Juvenal, SAMIA, BCACP  Medication Therapy Management Pharmacist     "

## 2024-10-07 ENCOUNTER — OFFICE VISIT (OUTPATIENT)
Dept: PHARMACY | Facility: PHYSICIAN GROUP | Age: 59
End: 2024-10-07
Payer: COMMERCIAL

## 2024-10-07 VITALS
BODY MASS INDEX: 30.04 KG/M2 | SYSTOLIC BLOOD PRESSURE: 122 MMHG | DIASTOLIC BLOOD PRESSURE: 68 MMHG | OXYGEN SATURATION: 99 % | WEIGHT: 156.4 LBS | HEART RATE: 77 BPM

## 2024-10-07 DIAGNOSIS — Z76.89 ENCOUNTER FOR WEIGHT MANAGEMENT: Primary | ICD-10-CM

## 2024-10-07 PROCEDURE — 99207 PR NO CHARGE LOS: CPT | Performed by: PHARMACIST

## 2024-10-07 NOTE — PATIENT INSTRUCTIONS
"Recommendations from today's MTM visit:                                                         Increase semaglutide to 1.7 mg weekly    Follow-up: Return in 4 weeks (on 11/4/2024) for Follow up, in person @ 3:00 PM.    It was great speaking with you today.  I value your experience and would be very thankful for your time in providing feedback in our clinic survey. In the next few days, you may receive an email or text message from [employer] with a link to a survey related to your  clinical pharmacist.\"     To schedule another MTM appointment, please call the clinic directly or you may call the MTM scheduling line at [phone].    My Clinical Pharmacist's contact information:                                                      Please feel free to contact me with any questions or concerns you have.      Marv Mendoza, Juvenal, SAMIA, BCACP  Medication Therapy Management Pharmacist     "

## 2024-10-07 NOTE — PROGRESS NOTES
Medication Therapy Management (MTM) Encounter    ASSESSMENT:                            Medication Adherence/Access: No issues identified    Weight Management:   Would benefit from increasing dose of semaglutide to continue to see desired weight loss.      PLAN:                            Increase semaglutide to 1.7 mg weekly     Follow-up: Return in 4 weeks (on 11/4/2024) for Follow up, in person @ 3:00 PM.    Marv Mendoza, PharmD, SAMIA, BCACP  Medication Therapy Management Pharmacist     SUBJECTIVE/OBJECTIVE:                          Vira Fallon is a 59 year old female seen for a follow-up visit.       Reason for visit: Weight Management.    Allergies/ADRs: Reviewed in chart  Past Medical History: Reviewed in chart  Tobacco: She reports that she has never smoked. She has never used smokeless tobacco.  Alcohol: none    Medication Adherence/Access: no issues reported    Weight Management   Topiramate 50 mg twice daily - states a rash that she was experiencing, but unsure if it was related to topriamate, has gone away. Tolerating well  Semaglutide 1 mg weekly.  States when she first bumped to the 1 mg dose, she had really bad nausea, but the subsequent doses have been okay. This coincided with some additional stress in her life, so she is unsure the exact cause.   Patient tried metformin in the past but did not tolerate. Did not lose any weight from this medication. States she was told she was insulin resistant by a past PCP  Nutrition/Eating Habits: Typical day: Usually eats yogurt in the morning with gluten free biscuits. Protein shakes for lunch with almond butter and half a banana. Dinner is fresh vegetables and fruit, occasional gluten free chicken strips or a salad. Doesn't feel hungry often. Will have bigger meals on weekends. Occasionally has potatoes.   Exercise/Activity: Asthma, chronic fatigue, and bad knees limit physical activity. Celiac disease also limits patient food options. Will walk about 20 to  "30 minutes Monday through Friday.  Has seen a dietary specialists in the past. Doesn't feel like it helped her.      Current weight today: 156 lbs 6.4 oz 156.4 lbs  Cumulative Weight Loss: -17 lb, -9.8 % from baseline    Starting weight: 173 lbs    Estimated body mass index is 30.04 kg/m  as calculated from the following:    Height as of 10/30/21: 5' 0.5\" (1.537 m).    Weight as of this encounter: 156 lb 6.4 oz (70.9 kg).         Today's Vitals: /68 (BP Location: Right arm)   Pulse 77   Wt 156 lb 6.4 oz (70.9 kg)   SpO2 99%   BMI 30.04 kg/m    ----------------      I spent 22 minutes with this patient today. All changes were made via collaborative practice agreement with Barbie Drew MD. A copy of the visit note was provided to the patient's provider(s).    A summary of these recommendations was given to the patient.    Marv Mendoza, PharmD, SAMIA, BCACP  Medication Therapy Management Pharmacist        Medication Therapy Recommendations  Encounter for weight management    Current Medication: SEMAGLUTIDE, 1 MG/DOSE, SC   Rationale: Dose too low - Dosage too low - Effectiveness   Recommendation: Increase Dose   Status: Accepted per CPA               "

## 2024-11-01 NOTE — PROGRESS NOTES
Medication Therapy Management (MTM) Encounter    ASSESSMENT:                            Medication Adherence/Access: No issues identified    Weight Management:   Patient expressed a desire to keep the current dosing, both due to current weight loss trajectory and concern for increased cost with the increased dosing. Reasonable to continue at current dosing.      PLAN:                            Continue at the 1.7 mg weekly semaglutide. We can continue at this dose as long as we need    If you start to plateau with your weight loss, we can increase to 2.4 mg weekly     Follow-up: Return in 2 months (on 1/7/2025) for Follow up, in person @ 3:00 PM.     Marv Mendoza, Juvenal, SAMIA, BCACP  Medication Therapy Management Pharmacist     SUBJECTIVE/OBJECTIVE:                          Vira Fallon is a 59 year old female seen for a follow-up visit.       Reason for visit: Weight Management.    Allergies/ADRs: Reviewed in chart  Past Medical History: Reviewed in chart  Tobacco: She reports that she has never smoked. She has never used smokeless tobacco.  Alcohol: none    Medication Adherence/Access: no issues reported    Weight Management   Topiramate 50 mg twice daily - states a rash that she was experiencing, but unsure if it was related to topriamate, has gone away. Tolerating well  Semaglutide 1.7 mg weekly - tolerating well. Did not experience any nausea as she did with previous dosing.  Patient tried metformin in the past but did not tolerate. Did not lose any weight from this medication. States she was told she was insulin resistant by a past PCP  Nutrition/Eating Habits: Typical day: Usually eats yogurt in the morning with gluten free biscuits. Protein shakes for lunch with almond butter and half a banana. Dinner is fresh vegetables and fruit, occasional gluten free chicken strips or a salad. Doesn't feel hungry often. Will have bigger meals on weekends. Occasionally has potatoes.   Exercise/Activity: Asthma, chronic  "fatigue, and bad knees limit physical activity. Celiac disease also limits patient food options. Will walk about 20 to 30 minutes Monday through Friday.  Has seen a dietary specialists in the past. Doesn't feel like it helped her.      Current weight today: 151 lbs 3.2 oz   Cumulative Weight Loss: -22 lb, -12.7 % from baseline    Starting weight: 173 lbs    Estimated body mass index is 29.04 kg/m  as calculated from the following:    Height as of 10/30/21: 5' 0.5\" (1.537 m).    Weight as of this encounter: 151 lb 3.2 oz (68.6 kg).       Today's Vitals: /72 (BP Location: Right arm)   Pulse 81   Wt 151 lb 3.2 oz (68.6 kg)   SpO2 99%   BMI 29.04 kg/m    ----------------      I spent 19 minutes with this patient today. All changes were made via collaborative practice agreement with Barbie Drew MD. A copy of the visit note was provided to the patient's provider(s).    A summary of these recommendations was given to the patient.    Marv Mendoza PharmD, SAMIA, BCACP  Medication Therapy Management Pharmacist        Medication Therapy Recommendations  No medication therapy recommendations to display        "

## 2024-11-04 ENCOUNTER — OFFICE VISIT (OUTPATIENT)
Dept: PHARMACY | Facility: PHYSICIAN GROUP | Age: 59
End: 2024-11-04
Payer: COMMERCIAL

## 2024-11-04 VITALS
DIASTOLIC BLOOD PRESSURE: 72 MMHG | BODY MASS INDEX: 29.04 KG/M2 | WEIGHT: 151.2 LBS | OXYGEN SATURATION: 99 % | HEART RATE: 81 BPM | SYSTOLIC BLOOD PRESSURE: 128 MMHG

## 2024-11-04 DIAGNOSIS — Z76.89 ENCOUNTER FOR WEIGHT MANAGEMENT: Primary | ICD-10-CM

## 2024-11-04 PROCEDURE — 99207 PR NO CHARGE LOS: CPT | Performed by: PHARMACIST

## 2024-11-04 NOTE — PATIENT INSTRUCTIONS
"Recommendations from today's MTM visit:                                                         Continue at the 1.7 mg weekly semaglutide. We can continue at this dose as long as we need    If you start to plateau with your weight loss, we can increase to 2.4 mg weekly    Follow-up: Return in 2 months (on 1/7/2025) for Follow up, in person @ 3:00 PM.    It was great speaking with you today.  I value your experience and would be very thankful for your time in providing feedback in our clinic survey. In the next few days, you may receive an email or text message from "[employer] with a link to a survey related to your  clinical pharmacist.\"     To schedule another MTM appointment, please call the clinic directly or you may call the MTM scheduling line at [phone].    My Clinical Pharmacist's contact information:                                                      Please feel free to contact me with any questions or concerns you have.      Marv Mendoza, Juvenal, SAMIA, BCACP  Medication Therapy Management Pharmacist     "

## 2024-12-07 ENCOUNTER — HEALTH MAINTENANCE LETTER (OUTPATIENT)
Age: 59
End: 2024-12-07

## 2025-01-07 ENCOUNTER — OFFICE VISIT (OUTPATIENT)
Dept: PHARMACY | Facility: PHYSICIAN GROUP | Age: 60
End: 2025-01-07
Payer: COMMERCIAL

## 2025-01-07 DIAGNOSIS — G43.111 INTRACTABLE MIGRAINE WITH AURA WITH STATUS MIGRAINOSUS: ICD-10-CM

## 2025-01-07 DIAGNOSIS — Z78.9 TAKES DIETARY SUPPLEMENTS: ICD-10-CM

## 2025-01-07 DIAGNOSIS — Z76.89 ENCOUNTER FOR WEIGHT MANAGEMENT: Primary | ICD-10-CM

## 2025-01-07 DIAGNOSIS — J30.2 SEASONAL ALLERGIC RHINITIS, UNSPECIFIED TRIGGER: ICD-10-CM

## 2025-01-07 DIAGNOSIS — L43.8 ORAL LICHEN PLANUS: ICD-10-CM

## 2025-01-07 DIAGNOSIS — J45.909 EXTRINSIC ASTHMA WITHOUT COMPLICATION, UNSPECIFIED ASTHMA SEVERITY, UNSPECIFIED WHETHER PERSISTENT: ICD-10-CM

## 2025-01-07 PROCEDURE — 99207 PR NO CHARGE LOS: CPT | Performed by: PHARMACIST

## 2025-01-07 NOTE — PROGRESS NOTES
Medication Therapy Management (MTM) Encounter    ASSESSMENT:                            Medication Adherence/Access: No issues identified    Weight Management:   Stable. Reasonable to continue current dose as patient is tolerating and continues to see weight loss.     Asthma   Stable    Migraines:  Stable    Seasonal Allergies:  Stable    Oral Lichen Planus:   Stable    Supplements:  Stable. May not need some of them, but patient insists on continuing. States she will discuss with me before starting more in the future. Patient would benefit from a calcium + vitamin D supplement, as she is not receiving enough from her diet.     PLAN:                            Continue semaglutide 1.7 mg weekly. I will send this refill into [facility] pharmacy    Start taking calcium 500 mg twice daily. Typically, these supplements also have vitamin D as well.    Follow-up: Return in 3 months (on 4/7/2025) for Follow up, in person @ 3:30 PM.    Marv Mendoza, PharmD, SAMIA, BCACP  Medication Therapy Management Pharmacist     SUBJECTIVE/OBJECTIVE:                          Vira Fallon is a 59 year old female coming in for a follow up visit, but an initial visit for 2025. She was referred to me from Dr. Drew.      Reason for visit: Medication Therapy Management.    Allergies/ADRs: Reviewed in chart  Past Medical History: Reviewed in chart  Tobacco: She reports that she has never smoked. She has never used smokeless tobacco.  Alcohol: none    Medication Adherence/Access: no issues reported      Weight Management   Topiramate 50 mg twice daily - states a rash that she was experiencing, but unsure if it was related to topriamate, has gone away. Tolerating well  Semaglutide 1.7 mg weekly - tolerating well. Did not experience any nausea as she did with previous dosing.  Patient tried metformin in the past but did not tolerate. Did not lose any weight from this medication. States she was told she was insulin resistant by a past  "PCP  Nutrition/Eating Habits: Typical day: Usually eats yogurt in the morning with gluten free biscuits. Protein shakes for lunch with almond butter and half a banana. Dinner is fresh vegetables and fruit, occasional gluten free chicken strips or a salad. Doesn't feel hungry often. Will have bigger meals on weekends. Occasionally has potatoes.   Exercise/Activity: Asthma, chronic fatigue, and bad knees limit physical activity. Celiac disease also limits patient food options. Will walk about 20 to 30 minutes Monday through Friday.  Has seen a dietary specialists in the past. Doesn't feel like it helped her.     Starting weight: 173 lbs     Current weight today: 138 lbs 12.8 oz    Wt Readings from Last 4 Encounters:   01/07/25 138 lb 12.8 oz (63 kg)   11/04/24 151 lb 3.2 oz (68.6 kg)   10/07/24 156 lb 6.4 oz (70.9 kg)   09/09/24 162 lb 9.6 oz (73.8 kg)     Estimated body mass index is 26.66 kg/m  as calculated from the following:    Height as of 10/30/21: 5' 0.5\" (1.537 m).    Weight as of this encounter: 138 lb 12.8 oz (63 kg).     Asthma   Albuterol HFA - two puffs every four hours as needed  Albuterol nebulizer solution - one vial inhaled via nebulizer every 6 hours as needed  Patient reports no current medication side effects.      Triggers include: smoke, pollens, animal dander, and strong odors and fumes.  Patient reports the following symptoms: none. Feels well controlled with albuterol.      Migraines:  Amitriptyline 25 mg every night  Acetaminophen 500 mg - one to two tablets every 6 hours as needed. Uses nightly.  Topiramate 50 mg twice daily - has noticed significant improvement since starting  Medication Awaiting PA: Nurtec 75 mg ODT    Past Medications:   Sumatriptan 50 mg - no longer tolerates, was taken off.      Seasonal Allergies:  Cetirizine 10 mg tablet one time daily  Fluticasone 50 mcg/act - two sprays in each nostril twice daily - requesting new prescription    Oral Lichen Planus:   Clobetasol " propionate 0.05% ointment - applied four times daily  No side effects. Feels it is helping    Supplements:   Sees a chiropractor/supplement specialist. Has her taking various supplements that change frequently.  Melatonin 6 mg nightly - liquid      Today's Vitals: /62 (BP Location: Right arm)   Wt 138 lb 12.8 oz (63 kg)   BMI 26.66 kg/m    ----------------      I spent 20 minutes with this patient today. All changes were made via collaborative practice agreement with Dr. Drew.     A summary of these recommendations was given to the patient.    Marv Mendoza, PharmD, SAMIA, BCACP  Medication Therapy Management Pharmacist        Medication Therapy Recommendations  No medication therapy recommendations to display

## 2025-01-07 NOTE — PATIENT INSTRUCTIONS
"Recommendations from today's MTM visit:                                                         Continue semaglutide 1.7 mg weekly. I will send this refill into [facility] pharmacy    Start taking calcium 500 mg twice daily. Typically, these supplements also have vitamin D as well.    Follow-up: Return in 3 months (on 4/7/2025) for Follow up, in person @ 3:30 PM.    It was great speaking with you today.  I value your experience and would be very thankful for your time in providing feedback in our clinic survey. In the next few days, you may receive an email or text message from [email or web address] with a link to a survey related to your  clinical pharmacist.\"     To schedule another MTM appointment, please call the clinic directly or you may call the MTM scheduling line at [phone].    My Clinical Pharmacist's contact information:                                                      Please feel free to contact me with any questions or concerns you have.      Marv Mendoza, Juvenal, SAMIA, BCACP  Medication Therapy Management Pharmacist     "

## 2025-01-08 VITALS — DIASTOLIC BLOOD PRESSURE: 62 MMHG | WEIGHT: 138.8 LBS | BODY MASS INDEX: 26.66 KG/M2 | SYSTOLIC BLOOD PRESSURE: 112 MMHG

## 2025-01-08 RX ORDER — TOPIRAMATE 50 MG/1
50 TABLET, FILM COATED ORAL 2 TIMES DAILY
COMMUNITY

## 2025-01-08 RX ORDER — CALCIUM CARBONATE 500(1250)
1 TABLET ORAL 2 TIMES DAILY WITH MEALS
COMMUNITY

## 2025-01-20 ENCOUNTER — MEDICAL CORRESPONDENCE (OUTPATIENT)
Dept: HEALTH INFORMATION MANAGEMENT | Facility: CLINIC | Age: 60
End: 2025-01-20

## 2025-01-20 ENCOUNTER — LAB REQUISITION (OUTPATIENT)
Dept: LAB | Facility: CLINIC | Age: 60
End: 2025-01-20
Payer: COMMERCIAL

## 2025-01-20 ENCOUNTER — TRANSFERRED RECORDS (OUTPATIENT)
Dept: HEALTH INFORMATION MANAGEMENT | Facility: CLINIC | Age: 60
End: 2025-01-20

## 2025-01-20 DIAGNOSIS — R94.31 ABNORMAL ELECTROCARDIOGRAM (ECG) (EKG): ICD-10-CM

## 2025-01-20 LAB
ALBUMIN SERPL BCG-MCNC: 4.4 G/DL (ref 3.5–5.2)
ALP SERPL-CCNC: 73 U/L (ref 40–150)
ALT SERPL W P-5'-P-CCNC: 12 U/L (ref 0–50)
ANION GAP SERPL CALCULATED.3IONS-SCNC: 11 MMOL/L (ref 7–15)
AST SERPL W P-5'-P-CCNC: 16 U/L (ref 0–45)
BILIRUB SERPL-MCNC: 0.5 MG/DL
BUN SERPL-MCNC: 15.3 MG/DL (ref 6–20)
CALCIUM SERPL-MCNC: 10.3 MG/DL (ref 8.8–10.4)
CHLORIDE SERPL-SCNC: 104 MMOL/L (ref 98–107)
CREAT SERPL-MCNC: 1.1 MG/DL (ref 0.51–0.95)
EGFRCR SERPLBLD CKD-EPI 2021: 58 ML/MIN/1.73M2
GLUCOSE SERPL-MCNC: 90 MG/DL (ref 70–99)
HCO3 SERPL-SCNC: 25 MMOL/L (ref 22–29)
MAGNESIUM SERPL-MCNC: 2.2 MG/DL (ref 1.7–2.3)
POTASSIUM SERPL-SCNC: 4.2 MMOL/L (ref 3.4–5.3)
PROT SERPL-MCNC: 7.1 G/DL (ref 6.4–8.3)
SODIUM SERPL-SCNC: 140 MMOL/L (ref 135–145)

## 2025-01-20 PROCEDURE — 80053 COMPREHEN METABOLIC PANEL: CPT | Mod: ORL | Performed by: STUDENT IN AN ORGANIZED HEALTH CARE EDUCATION/TRAINING PROGRAM

## 2025-01-20 PROCEDURE — 83735 ASSAY OF MAGNESIUM: CPT | Mod: ORL | Performed by: STUDENT IN AN ORGANIZED HEALTH CARE EDUCATION/TRAINING PROGRAM

## 2025-01-21 ENCOUNTER — TRANSCRIBE ORDERS (OUTPATIENT)
Dept: OTHER | Age: 60
End: 2025-01-21

## 2025-01-21 DIAGNOSIS — R94.31 DIFFUSE ST SEGMENT DEPRESSION: Primary | ICD-10-CM

## 2025-01-23 ENCOUNTER — OFFICE VISIT (OUTPATIENT)
Dept: CARDIOLOGY | Facility: CLINIC | Age: 60
End: 2025-01-23
Payer: COMMERCIAL

## 2025-01-23 VITALS
DIASTOLIC BLOOD PRESSURE: 62 MMHG | RESPIRATION RATE: 14 BRPM | BODY MASS INDEX: 26.43 KG/M2 | HEART RATE: 80 BPM | HEIGHT: 61 IN | SYSTOLIC BLOOD PRESSURE: 124 MMHG | WEIGHT: 140 LBS

## 2025-01-23 DIAGNOSIS — R07.2 PRECORDIAL PAIN: ICD-10-CM

## 2025-01-23 DIAGNOSIS — R94.31 DIFFUSE ST SEGMENT DEPRESSION: ICD-10-CM

## 2025-01-23 DIAGNOSIS — R94.31 ABNORMAL ELECTROCARDIOGRAM: Primary | ICD-10-CM

## 2025-01-23 RX ORDER — ASPIRIN 81 MG/1
81 TABLET, CHEWABLE ORAL DAILY
COMMUNITY
Start: 2025-01-20

## 2025-01-23 RX ORDER — SUMATRIPTAN 50 MG/1
50 TABLET, FILM COATED ORAL
COMMUNITY

## 2025-01-23 RX ORDER — FAMOTIDINE 20 MG/1
20 TABLET, FILM COATED ORAL 2 TIMES DAILY
COMMUNITY

## 2025-01-23 NOTE — PATIENT INSTRUCTIONS
Vira Fallon,    It was a pleasure to see you today at [facility].     My recommendations after this visit include:    CT of the heart and echo    AGNIESZKA Chandler MD, FACC, [facility]

## 2025-01-23 NOTE — LETTER
"1/23/2025    Barbie Drew MD  [address] [place] [address]    RE: Vira Fallon       Dear Colleague,     I had the pleasure of seeing Vira Fall[facility] in the [facility].        Thank you, Dr. Drew, for asking [facility] to evaluate Ms. Vira Fallon.      Assessment/Recommendations   Assessment:    Abnormal EKG, nonspecific, cannot rule out ischemia or RV abnormalities pulmonary hypertension etc.  Exertional dyspnea probably related to asthma but cannot rule out anginal equivalent  Nonspecific autoimmune disease    Plan:  Echo to assess function and presence of pulmonary hypertension  CT coronary angiogram to rule out obstructive coronary artery disease  Further recommendation when results of tests available     The longitudinal plan of care for the diagnosis(es)/condition(s) as documented were addressed during this visit. Due to the added complexity in care, I will continue to support Vira in the subsequent management and with ongoing continuity of care.      History of Present Illness    Ms. Vira Fallon is a 59 year old female who comes in for cardiac evaluation.  She went for routine colonoscopy and was found to have abnormal EKG.  Colonoscopy was canceled.  She has no history of known heart disease.  I could not find any old EKGs to compare against the current one.  She never had any cardiac testing.  She does get short of breath with physical activities.  She relates it to asthma and to recent COVID.  She has not had waking, PND, orthopnea.  She has variety of chronic medical conditions which she relates to her autoimmune diseases.    ECG: Personally reviewed.  EKG normal sinus rhythm with diffuse T wave inversion across precordial leads.         Physical Examination Review of Systems   /62 (BP Location: Left arm, Patient Position: Sitting, Cuff Size: Adult Regular)   Pulse 80   Resp 14   Ht 1.537 m (5' 0.5\")   Wt 63.5 kg (140 lb)  " " BMI 26.89 kg/m    Body mass index is 26.89 kg/m .  Wt Readings from Last 3 Encounters:   01/23/25 63.5 kg (140 lb)   01/07/25 63 kg (138 lb 12.8 oz)   11/04/24 68.6 kg (151 lb 3.2 oz)   She was offered chaperon for cardiac exam. She declined.  General Appearance:   Alert, cooperative, no distress, appears stated age   Head/ENT: Normocephalic, without obvious abnormality. Membranes moist      EYES:  no scleral icterus, normal conjunctivae   Neck: Supple, symmetrical, trachea midline, no adenopathy, thyroid: not enlarged, symmetric, no carotid bruit or JVD   Chest/Lungs:   Lungs are clear to auscultation, respirations unlabored. No tenderness or deformity    Cardiovascular:   Regular rhythm, S1, S2 normal, no murmur, rub or gallop.   Abdomen:  Soft, non-tender, bowel sounds active all four quadrants,  no masses, no organomegaly   Extremities: no cyanosis or clubbing. No edema   Skin: Skin color, texture, turgor normal, no rashes or lesions.    Psychiatric: Normal affect, calm   Neurologic: Alert and oriented x 3, moving all four extremities.     Encounter Vitals  BP: 124/62  Pulse: 80  Resp: 14  Weight: 63.5 kg (140 lb)  Height: 153.7 cm (5' 0.5\")                                          Lab Results    Chemistry/lipid CBC Cardiac Enzymes/BNP/TSH/INR   Recent Labs   Lab Test 06/14/23  1238   CHOL 161   HDL 77   LDL 71   TRIG 67     Recent Labs   Lab Test 06/14/23  1238 11/24/20  1041   LDL 71 106     Recent Labs   Lab Test 01/20/25  1231      POTASSIUM 4.2   CHLORIDE 104   CO2 25   GLC 90   BUN 15.3   CR 1.10*   GFRESTIMATED 58*   ROBERTA 10.3     Recent Labs   Lab Test 01/20/25  1231 05/05/22  1510 11/01/21  0536   CR 1.10* 0.81 0.78     Recent Labs   Lab Test 06/16/21  0728   A1C 5.2          Recent Labs   Lab Test 11/01/21  0536   WBC 4.3   HGB 7.3*   HCT 24.2*   MCV 98        Recent Labs   Lab Test 11/01/21  0536 10/31/21  0553 10/30/21  1011   HGB 7.3* 7.3* 8.3*    No results for input(s): \"TROPONINI\" " "in the last 21304 hours.  No results for input(s): \"BNP\", \"NTBNPI\", \"NTBNP\" in the last 76326 hours.  Recent Labs   Lab Test 05/13/24  1535   TSH 2.61     No results for input(s): \"INR\" in the last 58055 hours.     Medical History  Surgical History Family History Social History   Past Medical History:   Diagnosis Date     Celiac disease      Colitis      Uncomplicated asthma      Past Surgical History:   Procedure Laterality Date     COLONOSCOPY       LAPAROSCOPIC CHOLECYSTECTOMY N/A 10/18/2021    Procedure: LAPAROSCOPIC CHOLECYSTECTOMY;  Surgeon: Steve Ba MD;  Location: [facility] Main OR     ORTHOPEDIC SURGERY       No premature CAD, SCD,cardiomyopathy   Social History     Socioeconomic History     Marital status: Single     Spouse name: Not on file     Number of children: Not on file     Years of education: Not on file     Highest education level: Not on file   Occupational History     Not on file   Tobacco Use     Smoking status: Never     Smokeless tobacco: Never   Vaping Use     Vaping status: Never Used   Substance and Sexual Activity     Alcohol use: Not Currently     Comment: 1 x per year     Drug use: Never     Sexual activity: Not on file   Other Topics Concern     Parent/sibling w/ CABG, MI or angioplasty before 65F 55M? Not Asked   Social History Narrative     Not on file     Social Drivers of Health     Financial Resource Strain: Not on file   Food Insecurity: Not on file   Transportation Needs: Not on file   Physical Activity: Not on file   Stress: Not on file   Social Connections: Not on file   Interpersonal Safety: Not on file   Housing Stability: Not on file         Medications  Allergies   Current Outpatient Medications   Medication Sig Dispense Refill     acetaminophen (TYLENOL) 500 MG tablet Take 500-1,000 mg by mouth every 6 hours as needed for mild pain       albuterol (PROAIR HFA/PROVENTIL HFA/VENTOLIN HFA) 108 (90 Base) MCG/ACT inhaler Inhale 2 puffs into the lungs 4 times daily as " needed        albuterol (PROVENTIL) (2.5 MG/3ML) 0.083% neb solution Take 2.5 mg by nebulization every 6 hours as needed for shortness of breath, wheezing or cough       amitriptyline (ELAVIL) 25 MG tablet Take 25 mg by mouth At Bedtime       aspirin (ASA) 81 MG chewable tablet Take 81 mg by mouth daily.       cetirizine (ZYRTEC) 10 MG tablet Take 10 mg by mouth every evening        clobetasol (TEMOVATE) 0.05 % external ointment Apply topically 4 times daily as needed       famotidine (PEPCID) 20 MG tablet Take 20 mg by mouth 2 times daily.       fluticasone (FLONASE) 50 MCG/ACT nasal spray Spray 2 sprays into both nostrils daily as needed for other        melatonin (MELATONIN) 1 MG/ML LIQD liquid Take 3 mg by mouth at bedtime.       Semaglutide-Weight Management (WEGOVY) 1.7 MG/0.75ML pen Inject 1.7 mg subcutaneously once a week.       SUMAtriptan (IMITREX) 50 MG tablet Take 50 mg by mouth at onset of headache.       topiramate (TOPAMAX) 50 MG tablet Take 50 mg by mouth 2 times daily.       calcium carbonate (OS-ROBERTA) 500 MG TABS Take 1 tablet by mouth 2 times daily (with meals). (Patient not taking: Reported on 1/23/2025)          Allergies   Allergen Reactions     Animal Dander Itching and Shortness Of Breath     Dust Mites Unknown     Mold Unknown     Perfume Headache and Itching     Sulfamethoxazole-Trimethoprim Hives, Itching and Rash                        Thank you for allowing me to participate in the care of your patient.      Sincerely,     Ry Chandler MD     [facility]  cc:   Barbie Drew MD  [address] [place] [address]

## 2025-01-23 NOTE — PROGRESS NOTES
"      Thank you, Dr. Drew, for asking [facility] to evaluate Ms. Vira Fallon.      Assessment/Recommendations   Assessment:    Abnormal EKG, nonspecific, cannot rule out ischemia or RV abnormalities pulmonary hypertension etc.  Exertional dyspnea probably related to asthma but cannot rule out anginal equivalent  Nonspecific autoimmune disease    Plan:  Echo to assess function and presence of pulmonary hypertension  CT coronary angiogram to rule out obstructive coronary artery disease  Further recommendation when results of tests available     The longitudinal plan of care for the diagnosis(es)/condition(s) as documented were addressed during this visit. Due to the added complexity in care, I will continue to support Vira in the subsequent management and with ongoing continuity of care.      History of Present Illness    Ms. Vira Fallon is a 59 year old female who comes in for cardiac evaluation.  She went for routine colonoscopy and was found to have abnormal EKG.  Colonoscopy was canceled.  She has no history of known heart disease.  I could not find any old EKGs to compare against the current one.  She never had any cardiac testing.  She does get short of breath with physical activities.  She relates it to asthma and to recent COVID.  She has not had waking, PND, orthopnea.  She has variety of chronic medical conditions which she relates to her autoimmune diseases.    ECG: Personally reviewed.  EKG normal sinus rhythm with diffuse T wave inversion across precordial leads.         Physical Examination Review of Systems   /62 (BP Location: Left arm, Patient Position: Sitting, Cuff Size: Adult Regular)   Pulse 80   Resp 14   Ht 1.537 m (5' 0.5\")   Wt 63.5 kg (140 lb)   BMI 26.89 kg/m    Body mass index is 26.89 kg/m .  Wt Readings from Last 3 Encounters:   01/23/25 63.5 kg (140 lb)   01/07/25 63 kg (138 lb 12.8 oz)   11/04/24 68.6 kg (151 lb 3.2 oz)   She was offered " "chaperon for cardiac exam. She declined.  General Appearance:   Alert, cooperative, no distress, appears stated age   Head/ENT: Normocephalic, without obvious abnormality. Membranes moist      EYES:  no scleral icterus, normal conjunctivae   Neck: Supple, symmetrical, trachea midline, no adenopathy, thyroid: not enlarged, symmetric, no carotid bruit or JVD   Chest/Lungs:   Lungs are clear to auscultation, respirations unlabored. No tenderness or deformity    Cardiovascular:   Regular rhythm, S1, S2 normal, no murmur, rub or gallop.   Abdomen:  Soft, non-tender, bowel sounds active all four quadrants,  no masses, no organomegaly   Extremities: no cyanosis or clubbing. No edema   Skin: Skin color, texture, turgor normal, no rashes or lesions.    Psychiatric: Normal affect, calm   Neurologic: Alert and oriented x 3, moving all four extremities.     Encounter Vitals  BP: 124/62  Pulse: 80  Resp: 14  Weight: 63.5 kg (140 lb)  Height: 153.7 cm (5' 0.5\")                                          Lab Results    Chemistry/lipid CBC Cardiac Enzymes/BNP/TSH/INR   Recent Labs   Lab Test 06/14/23  1238   CHOL 161   HDL 77   LDL 71   TRIG 67     Recent Labs   Lab Test 06/14/23  1238 11/24/20  1041   LDL 71 106     Recent Labs   Lab Test 01/20/25  1231      POTASSIUM 4.2   CHLORIDE 104   CO2 25   GLC 90   BUN 15.3   CR 1.10*   GFRESTIMATED 58*   ROBERTA 10.3     Recent Labs   Lab Test 01/20/25  1231 05/05/22  1510 11/01/21  0536   CR 1.10* 0.81 0.78     Recent Labs   Lab Test 06/16/21  0728   A1C 5.2          Recent Labs   Lab Test 11/01/21  0536   WBC 4.3   HGB 7.3*   HCT 24.2*   MCV 98        Recent Labs   Lab Test 11/01/21  0536 10/31/21  0553 10/30/21  1011   HGB 7.3* 7.3* 8.3*    No results for input(s): \"TROPONINI\" in the last 73568 hours.  No results for input(s): \"BNP\", \"NTBNPI\", \"NTBNP\" in the last 42209 hours.  Recent Labs   Lab Test 05/13/24  1535   TSH 2.61     No results for input(s): \"INR\" in the last 72000 " hours.     Medical History  Surgical History Family History Social History   Past Medical History:   Diagnosis Date    Celiac disease     Colitis     Uncomplicated asthma      Past Surgical History:   Procedure Laterality Date    COLONOSCOPY      LAPAROSCOPIC CHOLECYSTECTOMY N/A 10/18/2021    Procedure: LAPAROSCOPIC CHOLECYSTECTOMY;  Surgeon: Steve Ba MD;  Location: [facility] Main OR    ORTHOPEDIC SURGERY       No premature CAD, SCD,cardiomyopathy   Social History     Socioeconomic History    Marital status: Single     Spouse name: Not on file    Number of children: Not on file    Years of education: Not on file    Highest education level: Not on file   Occupational History    Not on file   Tobacco Use    Smoking status: Never    Smokeless tobacco: Never   Vaping Use    Vaping status: Never Used   Substance and Sexual Activity    Alcohol use: Not Currently     Comment: 1 x per year    Drug use: Never    Sexual activity: Not on file   Other Topics Concern    Parent/sibling w/ CABG, MI or angioplasty before 65F 55M? Not Asked   Social History Narrative    Not on file     Social Drivers of Health     Financial Resource Strain: Not on file   Food Insecurity: Not on file   Transportation Needs: Not on file   Physical Activity: Not on file   Stress: Not on file   Social Connections: Not on file   Interpersonal Safety: Not on file   Housing Stability: Not on file         Medications  Allergies   Current Outpatient Medications   Medication Sig Dispense Refill    acetaminophen (TYLENOL) 500 MG tablet Take 500-1,000 mg by mouth every 6 hours as needed for mild pain      albuterol (PROAIR HFA/PROVENTIL HFA/VENTOLIN HFA) 108 (90 Base) MCG/ACT inhaler Inhale 2 puffs into the lungs 4 times daily as needed       albuterol (PROVENTIL) (2.5 MG/3ML) 0.083% neb solution Take 2.5 mg by nebulization every 6 hours as needed for shortness of breath, wheezing or cough      amitriptyline (ELAVIL) 25 MG tablet Take 25 mg by mouth  At Bedtime      aspirin (ASA) 81 MG chewable tablet Take 81 mg by mouth daily.      cetirizine (ZYRTEC) 10 MG tablet Take 10 mg by mouth every evening       clobetasol (TEMOVATE) 0.05 % external ointment Apply topically 4 times daily as needed      famotidine (PEPCID) 20 MG tablet Take 20 mg by mouth 2 times daily.      fluticasone (FLONASE) 50 MCG/ACT nasal spray Spray 2 sprays into both nostrils daily as needed for other       melatonin (MELATONIN) 1 MG/ML LIQD liquid Take 3 mg by mouth at bedtime.      Semaglutide-Weight Management (WEGOVY) 1.7 MG/0.75ML pen Inject 1.7 mg subcutaneously once a week.      SUMAtriptan (IMITREX) 50 MG tablet Take 50 mg by mouth at onset of headache.      topiramate (TOPAMAX) 50 MG tablet Take 50 mg by mouth 2 times daily.      calcium carbonate (OS-ROBERTA) 500 MG TABS Take 1 tablet by mouth 2 times daily (with meals). (Patient not taking: Reported on 1/23/2025)          Allergies   Allergen Reactions    Animal Dander Itching and Shortness Of Breath    Dust Mites Unknown    Mold Unknown    Perfume Headache and Itching    Sulfamethoxazole-Trimethoprim Hives, Itching and Rash

## 2025-02-03 ENCOUNTER — LAB REQUISITION (OUTPATIENT)
Dept: LAB | Facility: CLINIC | Age: 60
End: 2025-02-03
Payer: COMMERCIAL

## 2025-02-03 DIAGNOSIS — R79.89 OTHER SPECIFIED ABNORMAL FINDINGS OF BLOOD CHEMISTRY: ICD-10-CM

## 2025-02-03 PROCEDURE — 80048 BASIC METABOLIC PNL TOTAL CA: CPT | Mod: ORL | Performed by: STUDENT IN AN ORGANIZED HEALTH CARE EDUCATION/TRAINING PROGRAM

## 2025-02-04 ENCOUNTER — HOSPITAL ENCOUNTER (OUTPATIENT)
Dept: CARDIOLOGY | Facility: CLINIC | Age: 60
Discharge: HOME | End: 2025-02-04
Attending: INTERNAL MEDICINE
Payer: COMMERCIAL

## 2025-02-04 DIAGNOSIS — R07.2 PRECORDIAL PAIN: ICD-10-CM

## 2025-02-04 DIAGNOSIS — R94.31 ABNORMAL ELECTROCARDIOGRAM: ICD-10-CM

## 2025-02-04 LAB
ANION GAP SERPL CALCULATED.3IONS-SCNC: 14 MMOL/L (ref 7–15)
BUN SERPL-MCNC: 16.4 MG/DL (ref 6–20)
CALCIUM SERPL-MCNC: 9.3 MG/DL (ref 8.8–10.4)
CHLORIDE SERPL-SCNC: 106 MMOL/L (ref 98–107)
CREAT SERPL-MCNC: 1.12 MG/DL (ref 0.51–0.95)
EGFRCR SERPLBLD CKD-EPI 2021: 57 ML/MIN/1.73M2
GLUCOSE SERPL-MCNC: 119 MG/DL (ref 70–99)
HCO3 SERPL-SCNC: 20 MMOL/L (ref 22–29)
LVEF ECHO: NORMAL
POTASSIUM SERPL-SCNC: 3.9 MMOL/L (ref 3.4–5.3)
SODIUM SERPL-SCNC: 140 MMOL/L (ref 135–145)

## 2025-02-04 PROCEDURE — 93306 TTE W/DOPPLER COMPLETE: CPT | Mod: 26 | Performed by: STUDENT IN AN ORGANIZED HEALTH CARE EDUCATION/TRAINING PROGRAM

## 2025-02-04 PROCEDURE — 93306 TTE W/DOPPLER COMPLETE: CPT

## 2025-02-10 DIAGNOSIS — I35.1 AORTIC INSUFFICIENCY: ICD-10-CM

## 2025-02-10 DIAGNOSIS — R94.31 ABNORMAL ELECTROCARDIOGRAM: Primary | ICD-10-CM

## 2025-02-14 RX ORDER — METOPROLOL TARTRATE 1 MG/ML
5-20 INJECTION, SOLUTION INTRAVENOUS
Status: ACTIVE | OUTPATIENT
Start: 2025-02-14

## 2025-02-14 RX ORDER — NITROGLYCERIN 0.4 MG/1
0.4 TABLET SUBLINGUAL
Status: ACTIVE | OUTPATIENT
Start: 2025-02-14

## 2025-02-20 ENCOUNTER — TELEPHONE (OUTPATIENT)
Dept: CARDIOLOGY | Facility: CLINIC | Age: 60
End: 2025-02-20

## 2025-02-20 ENCOUNTER — HOSPITAL ENCOUNTER (OUTPATIENT)
Dept: CT IMAGING | Facility: CLINIC | Age: 60
End: 2025-02-20
Attending: INTERNAL MEDICINE
Payer: COMMERCIAL

## 2025-02-20 VITALS — SYSTOLIC BLOOD PRESSURE: 109 MMHG | DIASTOLIC BLOOD PRESSURE: 55 MMHG | HEART RATE: 80 BPM

## 2025-02-20 DIAGNOSIS — R94.31 ABNORMAL ELECTROCARDIOGRAM: ICD-10-CM

## 2025-02-20 DIAGNOSIS — R07.2 PRECORDIAL PAIN: ICD-10-CM

## 2025-02-20 LAB — BSA FOR ECHO PROCEDURE: 0 M2

## 2025-02-20 PROCEDURE — 250N000009 HC RX 250: Performed by: INTERNAL MEDICINE

## 2025-02-20 PROCEDURE — 75574 CT ANGIO HRT W/3D IMAGE: CPT

## 2025-02-20 PROCEDURE — 250N000013 HC RX MED GY IP 250 OP 250 PS 637: Performed by: INTERNAL MEDICINE

## 2025-02-20 PROCEDURE — 250N000011 HC RX IP 250 OP 636: Performed by: INTERNAL MEDICINE

## 2025-02-20 RX ORDER — IOPAMIDOL 755 MG/ML
90 INJECTION, SOLUTION INTRAVASCULAR ONCE
Status: COMPLETED | OUTPATIENT
Start: 2025-02-20 | End: 2025-02-20

## 2025-02-20 RX ADMIN — METOPROLOL TARTRATE 5 MG: 5 INJECTION INTRAVENOUS at 06:52

## 2025-02-20 RX ADMIN — METOPROLOL TARTRATE 5 MG: 5 INJECTION INTRAVENOUS at 06:48

## 2025-02-20 RX ADMIN — METOPROLOL TARTRATE 5 MG: 5 INJECTION INTRAVENOUS at 06:56

## 2025-02-20 RX ADMIN — NITROGLYCERIN 0.4 MG: 0.4 TABLET, ORALLY DISINTEGRATING SUBLINGUAL at 06:55

## 2025-02-20 RX ADMIN — IOPAMIDOL 90 ML: 755 INJECTION, SOLUTION INTRAVENOUS at 07:03

## 2025-02-20 NOTE — TELEPHONE ENCOUNTER
PC to patient and review of response. Requests letter be sent to [facility] in [place], Dr Chawla. No further questions. CMM,Rn

## 2025-02-20 NOTE — TELEPHONE ENCOUNTER
"Dr Rios, any cardiac reason to be ASA 81 mg daily? Ok to discharge given no CAD on CTA?    Secondly, may I provide letter for colonoscopy:   \"Cardiac workup is completed. No further testing prior to rescheduling colonoscopy\"? Thank you CMM,Rn   __________________________________________  PC with patient and review. Verbalized understanding. Follow-up echo and 1 year with [facility] from previous recommendation. Echo showed mild-mod AI. Requests a letter to proceed with colonoscopy. Secondly, PCP put her on ASA 81 mg once daily til testing was completed, still need to take? Hx: Colitis and aspirin not the best medication for this condition. Will route to [facility] for review and return call once update recs received. Discussion with patient that she has no CAD, so ASA would not be recommended, but will get final rec from cardiologist. CMM,Rn   "

## 2025-02-20 NOTE — TELEPHONE ENCOUNTER
----- Message from Ry Chandler sent at 2/20/2025  9:01 AM CST -----  Normal, no coronary atherosclerosis, reassurance.  Her symptoms are likely related to asthma.  Follow-up as needed

## 2025-02-20 NOTE — LETTER
2/20/2025      Vira Fallon  [address] [address]      [place] [facility] providers,      Patient was evaluated in our cardiac clinic. Normal cardiac workup is complete. No further cardiac testing needed prior to rescheduling colonoscopy.       Call if questions,     Mercedes WYMAN RN  CV Nurse Clinician on behalf of Dr. Chandler, cardiologist   [place] [id number] [facility] [address] [facility]  [id number]  [place] [address]  Office phone: [phone]   Fax: [phone]      Electronically signed

## 2025-02-20 NOTE — TELEPHONE ENCOUNTER
===View-only below this line===  ----- Message -----  From: Gilles Chandler MD  Sent: 2/20/2025  11:11 AM CST  To: Kenny Khan RN    She does not need to take aspirin.  You  can provide her with a letter.

## 2025-04-07 ENCOUNTER — OFFICE VISIT (OUTPATIENT)
Dept: PHARMACY | Facility: PHYSICIAN GROUP | Age: 60
End: 2025-04-07
Payer: COMMERCIAL

## 2025-04-07 VITALS — BODY MASS INDEX: 26.2 KG/M2 | WEIGHT: 136.4 LBS

## 2025-04-07 DIAGNOSIS — Z76.89 ENCOUNTER FOR WEIGHT MANAGEMENT: Primary | ICD-10-CM

## 2025-04-07 PROCEDURE — 99207 PR NO CHARGE LOS: CPT | Performed by: PHARMACIST

## 2025-04-07 NOTE — PROGRESS NOTES
Medication Therapy Management (MTM) Encounter    ASSESSMENT:                            Medication Adherence/Access: No issues identified    Weight Management:   Stable. Reasonable to continue current dose as patient is tolerating and continues to see weight loss. Discussed options as compounding of semaglutide comes to an end. Patient will consider options and reach out once she decides      PLAN:                            Continue semaglutide 1.7 mg once weekly    Once compounding of this medication ends, we may be able to switch to Zepbound through [employer] for $499/month. Reach out to the clinic when you make a decicion    Follow-up: Return in 3 months (on 7/7/2025) for Follow up, in person @ 3:00 PM.    Marv Mendoza, PharmD, SAMIA, BCACP  Medication Therapy Management Pharmacist     SUBJECTIVE/OBJECTIVE:                          Vira Fallon is a 59 year old female coming in for a follow up visit.     Reason for visit: Medication Therapy Management.    Allergies/ADRs: Reviewed in chart  Past Medical History: Reviewed in chart  Tobacco: She reports that she has never smoked. She has never used smokeless tobacco.  Alcohol: none    Medication Adherence/Access: no issues reported    Weight Management   Topiramate 50 mg twice daily - states a rash that she was experiencing, but unsure if it was related to topriamate, has gone away. Tolerating well  Semaglutide 1.7 mg weekly - tolerating well. Did not experience any nausea as she did with previous dosing.  Patient tried metformin in the past but did not tolerate. Did not lose any weight from this medication. States she was told she was insulin resistant by a past PCP  Nutrition/Eating Habits: Typical day: Usually eats yogurt in the morning with gluten free biscuits. Protein shakes for lunch with almond butter and half a banana. Dinner is fresh vegetables and fruit, occasional gluten free chicken strips or a salad. Doesn't feel hungry often. Will have bigger  "meals on weekends. Occasionally has potatoes.   Exercise/Activity: Asthma, chronic fatigue, and bad knees limit physical activity. Celiac disease also limits patient food options. Will walk about 20 to 30 minutes Monday through Friday.  Has seen a dietary specialists in the past. Doesn't feel like it helped her.     Starting weight: 173 lbs     Current weight today: 136 lbs 6.4 oz    Wt Readings from Last 4 Encounters:   04/07/25 136 lb 6.4 oz (61.9 kg)   01/23/25 140 lb (63.5 kg)   01/07/25 138 lb 12.8 oz (63 kg)   11/04/24 151 lb 3.2 oz (68.6 kg)     Estimated body mass index is 26.2 kg/m  as calculated from the following:    Height as of 1/23/25: 5' 0.5\" (1.537 m).    Weight as of this encounter: 136 lb 6.4 oz (61.9 kg).         Today's Vitals: Wt 136 lb 6.4 oz (61.9 kg)   BMI 26.20 kg/m    ----------------      I spent 20 minutes with this patient today. All changes were made via collaborative practice agreement with Dr. Drew.     A summary of these recommendations was given to the patient.    Marv Mendoza, [place], SAMIA, BCACP  Medication Therapy Management Pharmacist        Medication Therapy Recommendations  No medication therapy recommendations to display     "

## 2025-07-07 ENCOUNTER — OFFICE VISIT (OUTPATIENT)
Dept: PHARMACY | Facility: PHYSICIAN GROUP | Age: 60
End: 2025-07-07
Payer: COMMERCIAL

## 2025-07-07 VITALS
BODY MASS INDEX: 24.97 KG/M2 | WEIGHT: 130 LBS | HEART RATE: 86 BPM | SYSTOLIC BLOOD PRESSURE: 106 MMHG | OXYGEN SATURATION: 99 % | DIASTOLIC BLOOD PRESSURE: 58 MMHG

## 2025-07-07 DIAGNOSIS — G43.111 INTRACTABLE MIGRAINE WITH AURA WITH STATUS MIGRAINOSUS: ICD-10-CM

## 2025-07-07 DIAGNOSIS — Z76.89 ENCOUNTER FOR WEIGHT MANAGEMENT: Primary | ICD-10-CM

## 2025-07-07 PROCEDURE — 99207 PR NO CHARGE LOS: CPT | Performed by: PHARMACIST

## 2025-07-07 NOTE — PROGRESS NOTES
Medication Therapy Management (MTM) Encounter    ASSESSMENT:                            Medication Adherence/Access: No issues identified    Weight Management:   Stable. Reasonable to continue current dose as patient is tolerating and continues to see weight loss.    Headache   Migraine:   Discussed options with patient, including potentially increasing amitriptyline or starting propranolol. Her blood pressure may be too low to safely try propranolol and when discussing with Dr. Drew, she recommended the patient be evaluated prior to starting a new agent.    At this point, she has tried at least two preventative agents. Recommend she be assessed by a provider and if deemed appropriate, could consider initiating a CGRP or increasing amitriptyline as tolerated.     PLAN:                            Continue Zepbound 5 mg once weekly injection     Make an appointment with Dr. Drew to discuss migraine prevention options    Follow-up: Return in 9 weeks (on 9/8/2025) for Follow up, in person @ 3:00 PM.    Marv Mendoza, PharmD, SAMIA, BCACP  Medication Therapy Management Pharmacist     SUBJECTIVE/OBJECTIVE:                          Vira Fallon is a 60 year old female coming in for a follow up visit.     Reason for visit: Medication Therapy Management.    Allergies/ADRs: Reviewed in chart  Past Medical History: Reviewed in chart  Tobacco: She reports that she has never smoked. She has never used smokeless tobacco.  Alcohol: none    Medication Adherence/Access: no issues reported    Weight Management   Topiramate 50 mg twice daily - states a rash that she was experiencing, but unsure if it was related to topriamate, has gone away. Tolerating well  Zepbound 5 mg once weekly through [employer] -     Patient tried metformin in the past but did not tolerate. Did not lose any weight from this medication. States she was told she was insulin resistant by a past PCP    Nutrition/Eating Habits: Typical day: Usually eats yogurt  "in the morning with gluten free biscuits. Protein shakes for lunch with almond butter and half a banana. Dinner is fresh vegetables and fruit, occasional gluten free chicken strips or a salad. Doesn't feel hungry often. Will have bigger meals on weekends. Occasionally has potatoes.   Exercise/Activity: Asthma, chronic fatigue, and bad knees limit physical activity. Celiac disease also limits patient food options. Will walk about 20 to 30 minutes Monday through Friday.  Has seen a dietary specialists in the past. Doesn't feel like it helped her.     Starting weight: 173 lbs     Current weight today: 130 lbs 0 oz    Wt Readings from Last 4 Encounters:   07/07/25 130 lb (59 kg)   04/07/25 136 lb 6.4 oz (61.9 kg)   01/23/25 140 lb (63.5 kg)   01/07/25 138 lb 12.8 oz (63 kg)     Estimated body mass index is 24.97 kg/m  as calculated from the following:    Height as of 1/23/25: 5' 0.5\" (1.537 m).    Weight as of this encounter: 130 lb (59 kg).     Headache   Migraine:   Preventive medications  Amitriptyline 25 mg once daily  Topiramate 50 mg twice daily as above  Acute medications  Sumatriptan 50 mg  States she wakes up almost daily with a migraine lately. Feels topiramate helped for awhile but no longer does as much  Current side effects include: None.  States she tried beta blockers as a teenager with no benefit          Today's Vitals: /58 (BP Location: Right arm)   Pulse 86   Wt 130 lb (59 kg)   SpO2 99%   BMI 24.97 kg/m    ----------------      I spent 40 minutes with this patient today. All changes were made via collaborative practice agreement with Dr. Drew.     A summary of these recommendations was given to the patient.    Marv Mendoza, Juvenal, SAMIA, BCACP  Medication Therapy Management Pharmacist        Medication Therapy Recommendations  No medication therapy recommendations to display  "

## 2025-07-25 ENCOUNTER — LAB REQUISITION (OUTPATIENT)
Dept: LAB | Facility: CLINIC | Age: 60
End: 2025-07-25
Payer: COMMERCIAL

## 2025-07-25 DIAGNOSIS — R79.89 OTHER SPECIFIED ABNORMAL FINDINGS OF BLOOD CHEMISTRY: ICD-10-CM

## 2025-07-25 LAB
ANION GAP SERPL CALCULATED.3IONS-SCNC: 13 MMOL/L (ref 7–15)
BUN SERPL-MCNC: 10.2 MG/DL (ref 6–20)
CALCIUM SERPL-MCNC: 9.3 MG/DL (ref 8.8–10.4)
CHLORIDE SERPL-SCNC: 107 MMOL/L (ref 98–107)
CREAT SERPL-MCNC: 1.06 MG/DL (ref 0.51–0.95)
EGFRCR SERPLBLD CKD-EPI 2021: 61 ML/MIN/1.73M2
GLUCOSE SERPL-MCNC: 83 MG/DL (ref 70–99)
HCO3 SERPL-SCNC: 20 MMOL/L (ref 22–29)
POTASSIUM SERPL-SCNC: 3.7 MMOL/L (ref 3.4–5.3)
SODIUM SERPL-SCNC: 140 MMOL/L (ref 135–145)

## 2025-07-25 PROCEDURE — 80048 BASIC METABOLIC PNL TOTAL CA: CPT | Mod: ORL | Performed by: STUDENT IN AN ORGANIZED HEALTH CARE EDUCATION/TRAINING PROGRAM

## (undated) DEVICE — DRAIN RND 1/8 10FR SIL 0070210

## (undated) DEVICE — DRSG STERI STRIP 1/2X4" R1547

## (undated) DEVICE — CUSTOM PACK LAP CHOLE SBA5BLCHEA

## (undated) DEVICE — SUTURE VICRYL+ 4-0 UNDYED PS-2 VCP496H

## (undated) DEVICE — TUBING SMOKE EVAC PNEUMOCLEAR HIGH FLOW 0620050250

## (undated) DEVICE — SOL WATER IRRIG 1000ML BOTTLE 2F7114

## (undated) DEVICE — PREP DURAPREP 26ML APL 8630

## (undated) DEVICE — ENDO POUCH UNIV RETRIEVAL SYSTEM INZII 10MM CD001

## (undated) DEVICE — NDL INSUFFLATION 13GA 120MM C2201

## (undated) DEVICE — CLIP APPLIER ENDO ROTATING 10MM MED/LG ER320

## (undated) DEVICE — TUBING LAP SUCT/IRRIG STRYKER 250070500

## (undated) DEVICE — ENDO TROCAR SHIELDED BLADED KII Z-THRD 05X100MM CTB03

## (undated) DEVICE — SUCTION MANIFOLD NEPTUNE 2 SYS 1 PORT 702-025-000

## (undated) DEVICE — Device

## (undated) DEVICE — BASIN EMESIS STERILE  SSK9005A

## (undated) DEVICE — SU VICRYL+ 0 27IN CT-2 UND VCP270H

## (undated) DEVICE — DRSG GAUZE 4X4" 3033

## (undated) DEVICE — SOL RINGERS LACTATED 1000ML BAG 2B2324X

## (undated) DEVICE — GLOVE SURG PI ULTRA TOUCH M SZ 7-1/2 LF

## (undated) DEVICE — SOL NACL 0.9% IRRIG 1000ML BOTTLE 2F7124

## (undated) DEVICE — ENDO TROCAR SLEEVE KII Z-THREADED 11X100MM CTS12

## (undated) DEVICE — PLATE GROUNDING ADULT W/CORD 9165L

## (undated) DEVICE — DRAIN RESERVOIR 100ML JP 0070740

## (undated) DEVICE — DRESSING COVERLET STRIP 3/4 X 3 LF 230

## (undated) DEVICE — ENDO TROCAR SHIELDED BLADED KII Z-THRD 11X100MM CTB33

## (undated) DEVICE — ENDO TROCAR SLEEVE KII Z-THREADED 05X100MM CTS02